# Patient Record
Sex: MALE | Race: WHITE | HISPANIC OR LATINO | Employment: OTHER | URBAN - METROPOLITAN AREA
[De-identification: names, ages, dates, MRNs, and addresses within clinical notes are randomized per-mention and may not be internally consistent; named-entity substitution may affect disease eponyms.]

---

## 2017-09-08 ENCOUNTER — OFFICE VISIT (OUTPATIENT)
Dept: MEDICAL GROUP | Facility: PHYSICIAN GROUP | Age: 77
End: 2017-09-08
Payer: MEDICARE

## 2017-09-08 VITALS
WEIGHT: 149 LBS | BODY MASS INDEX: 23.95 KG/M2 | RESPIRATION RATE: 16 BRPM | DIASTOLIC BLOOD PRESSURE: 90 MMHG | HEIGHT: 66 IN | HEART RATE: 62 BPM | TEMPERATURE: 98.5 F | OXYGEN SATURATION: 99 % | SYSTOLIC BLOOD PRESSURE: 142 MMHG

## 2017-09-08 DIAGNOSIS — E11.9 TYPE 2 DIABETES MELLITUS WITHOUT COMPLICATION, WITHOUT LONG-TERM CURRENT USE OF INSULIN (HCC): ICD-10-CM

## 2017-09-08 DIAGNOSIS — N40.1 BENIGN NON-NODULAR PROSTATIC HYPERPLASIA WITH LOWER URINARY TRACT SYMPTOMS: ICD-10-CM

## 2017-09-08 DIAGNOSIS — I10 ESSENTIAL HYPERTENSION: ICD-10-CM

## 2017-09-08 DIAGNOSIS — Z23 NEED FOR VACCINATION: ICD-10-CM

## 2017-09-08 PROCEDURE — 90662 IIV NO PRSV INCREASED AG IM: CPT | Performed by: INTERNAL MEDICINE

## 2017-09-08 PROCEDURE — 99204 OFFICE O/P NEW MOD 45 MIN: CPT | Mod: 25 | Performed by: INTERNAL MEDICINE

## 2017-09-08 PROCEDURE — G0008 ADMIN INFLUENZA VIRUS VAC: HCPCS | Performed by: INTERNAL MEDICINE

## 2017-09-08 RX ORDER — TAMSULOSIN HYDROCHLORIDE 0.4 MG/1
0.4 CAPSULE ORAL DAILY
Qty: 30 CAP | Refills: 1 | Status: SHIPPED | OUTPATIENT
Start: 2017-09-08 | End: 2017-10-25 | Stop reason: SDUPTHER

## 2017-09-08 RX ORDER — GLIPIZIDE 5 MG/1
5 TABLET ORAL 2 TIMES DAILY
COMMUNITY
End: 2017-09-08

## 2017-09-08 RX ORDER — TAMSULOSIN HYDROCHLORIDE 0.4 MG/1
0.4 CAPSULE ORAL DAILY
COMMUNITY
End: 2017-09-08

## 2017-09-08 RX ORDER — GLIPIZIDE 5 MG/1
5 TABLET ORAL 2 TIMES DAILY
Qty: 60 TAB | Refills: 1 | Status: SHIPPED | OUTPATIENT
Start: 2017-09-08 | End: 2017-10-25 | Stop reason: SDUPTHER

## 2017-09-08 ASSESSMENT — PATIENT HEALTH QUESTIONNAIRE - PHQ9: CLINICAL INTERPRETATION OF PHQ2 SCORE: 0

## 2017-09-08 NOTE — ASSESSMENT & PLAN NOTE
Not sure about the names of meds. He denies chest pain, palpitation, headache, blurry visions, leg swelling

## 2017-09-08 NOTE — PROGRESS NOTES
New Patient to Establish    Reason to establish: Diabetes, medication refills    Wilmar Zaragoza is a 76 y.o. male here today for evaluation and management of:    Benign non-nodular prostatic hyperplasia with lower urinary tract symptoms  Pain during urination. tamsulosin release his symptoms.     Type 2 diabetes mellitus without complication (CMS-HCC)  Japanese speaking, he tells me that he is on metformin 500 mg BID and glipizide 5 mg BID. He does check fingerstick glucose in the morning and sometimes before he goes to sleep. he reports that the glucose level is between 120-130. He cannot recall his last A1c. Not clear if he is on aspirin. He tells me that he is on BP meds, but does not know the name, and he did not brought all meds with him, just the ones he was running out. He denies unintentional weight loss, nocturia, polyuria, numbness or tingling.    Essential hypertension  Not sure about the names of meds. He denies chest pain, palpitation, headache, blurry visions, leg swelling       Past Medical History:   Diagnosis Date   • Diabetes (CMS-HCC)    • Hyperlipidemia    • Hypertension        Current Outpatient Prescriptions   Medication Sig Dispense Refill   • glipiZIDE (GLUCOTROL) 5 MG Tab Take 1 Tab by mouth 2 times a day. 60 Tab 1   • metformin (GLUCOPHAGE) 500 MG Tab Take 1 Tab by mouth 2 times a day, with meals. 60 Tab 1   • tamsulosin (FLOMAX) 0.4 MG capsule Take 1 Cap by mouth every day. 30 Cap 1     No current facility-administered medications for this visit.        Allergies as of 09/08/2017   • (No Known Allergies)       Social History     Social History   • Marital status:      Spouse name: N/A   • Number of children: N/A   • Years of education: N/A     Occupational History   • Not on file.     Social History Main Topics   • Smoking status: Former Smoker     Packs/day: 2.00     Years: 7.00     Quit date: 9/8/1993   • Smokeless tobacco: Never Used   • Alcohol use No   • Drug use: No   • Sexual  "activity: Yes     Partners: Female      Comment: 2 sons     Other Topics Concern   • Not on file     Social History Narrative   • No narrative on file       Family History   Problem Relation Age of Onset   • Cancer Sister      stomach cancer   • Diabetes Neg Hx    • Heart Disease Neg Hx        Past Surgical History:   Procedure Laterality Date   • CATARACT EXTRACTION WITH IOL         ROS: All systems reviewed are negative except for HPI    /90   Pulse 62   Temp 36.9 °C (98.5 °F)   Resp 16   Ht 1.664 m (5' 5.5\")   Wt 67.6 kg (149 lb)   SpO2 99%   BMI 24.42 kg/m²     Physical Exam  General:  Alert and oriented, No apparent distress.  Eyes: Pupils equal and reactive. No scleral icterus. EOMI  Throat: Clear no erythema or exudates noted. Oral mucosa moist, oral dental intact  Neck: Supple. No cervical or supraclavicular lymphadenopathy noted. Thyroid not enlarged.  Lungs: normal effort,  Clear to auscultation  Cardiovascular: Regular rate and rhythm. No murmurs, rubs or gallops, pulses intact   Abdomen:  Soft, +BS, no tenderness. No rebound or guarding noted. No hepato or splenomegaly   Extremities: No clubbing, cyanosis, edema.  Neuro: cranial nerves intact, sensation intact   Muscle skeletal: muscle strength 5/5 on all extremities   Skin: Clear. No rash or suspicious skin lesions noted.      Assessment and Plan    1. Essential hypertension  Controlled today in the clinic. Continue same treatment. We will reevaluate next appointment. Patient needs to be on Ace or ARB  - CBC WITH DIFFERENTIAL; Future    2. Type 2 diabetes mellitus without complication, without long-term current use of insulin (CMS-[facility])  Continue same treatment. Reevaluate in next appointment. Make sure patient is on  aspirin and statins  - HEMOGLOBIN A1C; Future  - MICROALBUMIN CREAT RATIO URINE; Future  - CBC WITH DIFFERENTIAL; Future  - COMP METABOLIC PANEL; Future  - LIPID PROFILE; Future  - TSH WITH REFLEX TO FT4; Future    3. Benign " non-nodular prostatic hyperplasia with lower urinary tract symptoms  Continue same treatment   - CBC WITH DIFFERENTIAL; Future    4. Need for vaccination  Flu shot given, will try to get rest of pervious medical records.   - INFLUENZA VACCINE, HIGH DOSE (65+ ONLY)      Followup: Return in about 4 weeks (around 10/6/2017) for Short.    Signed by: Gera Dee M.D.

## 2017-09-09 ENCOUNTER — HOSPITAL ENCOUNTER (OUTPATIENT)
Dept: LAB | Facility: MEDICAL CENTER | Age: 77
End: 2017-09-09
Attending: INTERNAL MEDICINE
Payer: MEDICARE

## 2017-09-09 DIAGNOSIS — N40.1 BENIGN NON-NODULAR PROSTATIC HYPERPLASIA WITH LOWER URINARY TRACT SYMPTOMS: ICD-10-CM

## 2017-09-09 DIAGNOSIS — E11.9 TYPE 2 DIABETES MELLITUS WITHOUT COMPLICATION, WITHOUT LONG-TERM CURRENT USE OF INSULIN (HCC): ICD-10-CM

## 2017-09-09 DIAGNOSIS — I10 ESSENTIAL HYPERTENSION: ICD-10-CM

## 2017-09-09 LAB
ALBUMIN SERPL BCP-MCNC: 4.3 G/DL (ref 3.2–4.9)
ALBUMIN/GLOB SERPL: 1.2 G/DL
ALP SERPL-CCNC: 69 U/L (ref 30–99)
ALT SERPL-CCNC: 17 U/L (ref 2–50)
ANION GAP SERPL CALC-SCNC: 8 MMOL/L (ref 0–11.9)
AST SERPL-CCNC: 20 U/L (ref 12–45)
BASOPHILS # BLD AUTO: 1 % (ref 0–1.8)
BASOPHILS # BLD: 0.05 K/UL (ref 0–0.12)
BILIRUB SERPL-MCNC: 0.6 MG/DL (ref 0.1–1.5)
BUN SERPL-MCNC: 11 MG/DL (ref 8–22)
CALCIUM SERPL-MCNC: 9.7 MG/DL (ref 8.5–10.5)
CHLORIDE SERPL-SCNC: 103 MMOL/L (ref 96–112)
CHOLEST SERPL-MCNC: 174 MG/DL (ref 100–199)
CO2 SERPL-SCNC: 29 MMOL/L (ref 20–33)
CREAT SERPL-MCNC: 0.84 MG/DL (ref 0.5–1.4)
CREAT UR-MCNC: 18.6 MG/DL
EOSINOPHIL # BLD AUTO: 0.12 K/UL (ref 0–0.51)
EOSINOPHIL NFR BLD: 2.4 % (ref 0–6.9)
ERYTHROCYTE [DISTWIDTH] IN BLOOD BY AUTOMATED COUNT: 42.5 FL (ref 35.9–50)
EST. AVERAGE GLUCOSE BLD GHB EST-MCNC: 166 MG/DL
GFR SERPL CREATININE-BSD FRML MDRD: >60 ML/MIN/1.73 M 2
GLOBULIN SER CALC-MCNC: 3.6 G/DL (ref 1.9–3.5)
GLUCOSE SERPL-MCNC: 149 MG/DL (ref 65–99)
HBA1C MFR BLD: 7.4 % (ref 0–5.6)
HCT VFR BLD AUTO: 44.1 % (ref 42–52)
HDLC SERPL-MCNC: 42 MG/DL
HGB BLD-MCNC: 15.1 G/DL (ref 14–18)
IMM GRANULOCYTES # BLD AUTO: 0.01 K/UL (ref 0–0.11)
IMM GRANULOCYTES NFR BLD AUTO: 0.2 % (ref 0–0.9)
LDLC SERPL CALC-MCNC: 89 MG/DL
LYMPHOCYTES # BLD AUTO: 1.09 K/UL (ref 1–4.8)
LYMPHOCYTES NFR BLD: 22 % (ref 22–41)
MCH RBC QN AUTO: 29.9 PG (ref 27–33)
MCHC RBC AUTO-ENTMCNC: 34.2 G/DL (ref 33.7–35.3)
MCV RBC AUTO: 87.3 FL (ref 81.4–97.8)
MICROALBUMIN UR-MCNC: <0.7 MG/DL
MICROALBUMIN/CREAT UR: NORMAL MG/G (ref 0–30)
MONOCYTES # BLD AUTO: 0.35 K/UL (ref 0–0.85)
MONOCYTES NFR BLD AUTO: 7.1 % (ref 0–13.4)
NEUTROPHILS # BLD AUTO: 3.34 K/UL (ref 1.82–7.42)
NEUTROPHILS NFR BLD: 67.3 % (ref 44–72)
NRBC # BLD AUTO: 0 K/UL
NRBC BLD AUTO-RTO: 0 /100 WBC
PLATELET # BLD AUTO: 235 K/UL (ref 164–446)
PMV BLD AUTO: 11.2 FL (ref 9–12.9)
POTASSIUM SERPL-SCNC: 4 MMOL/L (ref 3.6–5.5)
PROT SERPL-MCNC: 7.9 G/DL (ref 6–8.2)
RBC # BLD AUTO: 5.05 M/UL (ref 4.7–6.1)
SODIUM SERPL-SCNC: 140 MMOL/L (ref 135–145)
TRIGL SERPL-MCNC: 216 MG/DL (ref 0–149)
TSH SERPL DL<=0.005 MIU/L-ACNC: 0.93 UIU/ML (ref 0.3–3.7)
WBC # BLD AUTO: 5 K/UL (ref 4.8–10.8)

## 2017-09-09 PROCEDURE — 84443 ASSAY THYROID STIM HORMONE: CPT

## 2017-09-09 PROCEDURE — 80061 LIPID PANEL: CPT

## 2017-09-09 PROCEDURE — 83036 HEMOGLOBIN GLYCOSYLATED A1C: CPT

## 2017-09-09 PROCEDURE — 82043 UR ALBUMIN QUANTITATIVE: CPT

## 2017-09-09 PROCEDURE — 85025 COMPLETE CBC W/AUTO DIFF WBC: CPT

## 2017-09-09 PROCEDURE — 80053 COMPREHEN METABOLIC PANEL: CPT

## 2017-09-09 PROCEDURE — 82570 ASSAY OF URINE CREATININE: CPT

## 2017-09-09 PROCEDURE — 36415 COLL VENOUS BLD VENIPUNCTURE: CPT

## 2017-09-11 ENCOUNTER — TELEPHONE (OUTPATIENT)
Dept: MEDICAL GROUP | Facility: PHYSICIAN GROUP | Age: 77
End: 2017-09-11

## 2017-09-11 NOTE — TELEPHONE ENCOUNTER
----- Message from Gera Dee M.D. sent at 9/9/2017  6:56 PM PDT -----  Please notify patient that blood sugar is not very well controled, but not very bad  , kidney function, liver function, blood count and thyroid  are normal  Cholesterol slight elevated due to diabetes  Continue same medications will review at next apt   Thank you,    Gera Dee M.D.

## 2017-10-12 ENCOUNTER — TELEPHONE (OUTPATIENT)
Dept: MEDICAL GROUP | Facility: PHYSICIAN GROUP | Age: 77
End: 2017-10-12

## 2017-10-12 NOTE — TELEPHONE ENCOUNTER
ESTABLISHED PATIENT PRE-VISIT PLANNING     Note: Patient will not be contacted if there is no indication to call.     1.  Reviewed notes from the last few office visits within the medical group: Yes    2.  If any orders were placed at last visit or intended to be done for this visit (i.e. 6 mos follow-up), do we have Results/Consult Notes?        •  Labs - Labs ordered, completed on 09/09/17 and results are in chart.   Note: If patient appointment is for lab review and patient did not complete labs,                check with provider if OK to reschedule patient until labs completed.       •  Imaging - Imaging was not ordered at last office visit.       •  Referrals - No referrals were ordered at last office visit.    3. Is this appointment scheduled as a Hospital Follow-Up? No    4.  Immunizations were updated in Epic using WebIZ?: Epic matches WebIZ       •  Web Iz Recommendations: PREVNAR (PCV13) , TDAP and ZOSTAVAX (Shingles)    5.  Patient is due for the following Health Maintenance Topics:   Health Maintenance Due   Topic Date Due   • Annual Wellness Visit  1940   • DIABETES MONOFILAMENT / LE EXAM  05/23/1940   • RETINAL SCREENING  11/23/1957   • IMM DTaP/Tdap/Td Vaccine (1 - Tdap) 11/23/1958   • COLONOSCOPY  11/23/1989   • IMM ZOSTER VACCINE  11/23/1999   • IMM PNEUMOCOCCAL 65+ (ADULT) LOW/MEDIUM RISK SERIES (1 of 2 - PCV13) 11/23/2004       - Patient has completed FLU Immunization(s) per WebIZ. Chart has been updated.      6.  Patient was NOT informed to arrive 15 min prior to their scheduled appointment and bring in their medication bottles.

## 2017-10-13 ENCOUNTER — OFFICE VISIT (OUTPATIENT)
Dept: MEDICAL GROUP | Facility: PHYSICIAN GROUP | Age: 77
End: 2017-10-13
Payer: MEDICARE

## 2017-10-13 VITALS
RESPIRATION RATE: 14 BRPM | HEART RATE: 63 BPM | BODY MASS INDEX: 22.98 KG/M2 | TEMPERATURE: 97.4 F | DIASTOLIC BLOOD PRESSURE: 74 MMHG | HEIGHT: 66 IN | SYSTOLIC BLOOD PRESSURE: 126 MMHG | OXYGEN SATURATION: 98 % | WEIGHT: 143 LBS

## 2017-10-13 DIAGNOSIS — Z12.11 SCREEN FOR COLON CANCER: ICD-10-CM

## 2017-10-13 DIAGNOSIS — I10 ESSENTIAL HYPERTENSION: ICD-10-CM

## 2017-10-13 DIAGNOSIS — E11.9 TYPE 2 DIABETES MELLITUS WITHOUT COMPLICATION, WITHOUT LONG-TERM CURRENT USE OF INSULIN (HCC): ICD-10-CM

## 2017-10-13 DIAGNOSIS — N40.1 BENIGN NON-NODULAR PROSTATIC HYPERPLASIA WITH LOWER URINARY TRACT SYMPTOMS: ICD-10-CM

## 2017-10-13 DIAGNOSIS — E78.2 MIXED HYPERLIPIDEMIA: ICD-10-CM

## 2017-10-13 DIAGNOSIS — R63.4 WEIGHT LOSS: ICD-10-CM

## 2017-10-13 DIAGNOSIS — Z12.5 PROSTATE CANCER SCREENING: ICD-10-CM

## 2017-10-13 DIAGNOSIS — E78.5 HYPERLIPIDEMIA, UNSPECIFIED HYPERLIPIDEMIA TYPE: ICD-10-CM

## 2017-10-13 PROCEDURE — 99214 OFFICE O/P EST MOD 30 MIN: CPT | Performed by: INTERNAL MEDICINE

## 2017-10-13 NOTE — PATIENT INSTRUCTIONS
Start taking metformin one tab and a half in the morning and at night   Start taking blood pressure medications   Start taking baby aspirin daily   Start taking cholesterol medicine  Please call us with the name of medications for blood pressure and cholesterol medicine   Do prostate test lab work whenever you can  Follow up with eye doctor  Follow up for colonoscopy

## 2017-10-13 NOTE — ASSESSMENT & PLAN NOTE
He has been followed by urologist in the past. He is on tamsulosin 0.4 mg daily. He denies any symptoms. He reports prostate biopsy and was normal

## 2017-10-13 NOTE — ASSESSMENT & PLAN NOTE
As per above, stopped statin on his own.   Lipid panel ok, LDL 89. I encouraged to restart statin

## 2017-10-13 NOTE — ASSESSMENT & PLAN NOTE
He claims weight loss due to taking care of grand kids and working. He denies lymphadenopathy, night sweats, dysphagia, melanotic stool, hematochezia. He had prostate checked 5 years ago. NO Urination problem,   He has never had a colonoscopy.

## 2017-10-13 NOTE — ASSESSMENT & PLAN NOTE
Last a1c 7.4. He supposed to be on ACE and statin, but he stopped them on his own. No microalbuminuria. Monofilament testing done today. No complications. Long discussion and education about diabetes. He tries to eat healthy, once in awhile he would get a dessert. Denies neuropathy, retinopathy. He used to follow up with ophthalmologist

## 2017-10-13 NOTE — PROGRESS NOTES
Subjective:   Wilmar Zaragoza is a 76 y.o. male here today for Diabetes, weight loss    Weight loss  He claims weight loss due to taking care of grand kids and working. He denies lymphadenopathy, night sweats, dysphagia, melanotic stool, hematochezia. He had prostate checked 5 years ago. NO Urination problem,   He has never had a colonoscopy.    Type 2 diabetes mellitus without complication (CMS-HCC)  Last a1c 7.4. He supposed to be on ACE and statin, but he stopped them on his own. No microalbuminuria. Monofilament testing done today. No complications. Long discussion and education about diabetes. He tries to eat healthy, once in awhile he would get a dessert. Denies neuropathy, retinopathy. He used to follow up with ophthalmologist    Essential hypertension  He suppose to be on meds. Not taking.     Benign non-nodular prostatic hyperplasia with lower urinary tract symptoms  He has been followed by urologist in the past. He is on tamsulosin 0.4 mg daily. He denies any symptoms. He reports prostate biopsy and was normal    Hyperlipidemia  As per above, stopped statin on his own.   Lipid panel ok, LDL 89. I encouraged to restart statin        Current medicines (including changes today)  Current Outpatient Prescriptions   Medication Sig Dispense Refill   • glipiZIDE (GLUCOTROL) 5 MG Tab Take 1 Tab by mouth 2 times a day. 60 Tab 1   • metformin (GLUCOPHAGE) 500 MG Tab Take 1 Tab by mouth 2 times a day, with meals. 60 Tab 1   • tamsulosin (FLOMAX) 0.4 MG capsule Take 1 Cap by mouth every day. 30 Cap 1     No current facility-administered medications for this visit.      He  has a past medical history of Diabetes (CMS-HCC); Hyperlipidemia; and Hypertension.    Current Outpatient Prescriptions   Medication Sig Dispense Refill   • glipiZIDE (GLUCOTROL) 5 MG Tab Take 1 Tab by mouth 2 times a day. 60 Tab 1   • metformin (GLUCOPHAGE) 500 MG Tab Take 1 Tab by mouth 2 times a day, with meals. 60 Tab 1   • tamsulosin (FLOMAX) 0.4  "MG capsule Take 1 Cap by mouth every day. 30 Cap 1     No current facility-administered medications for this visit.        Allergies as of 10/13/2017   • (No Known Allergies)       Social History     Social History   • Marital status:      Spouse name: N/A   • Number of children: N/A   • Years of education: N/A     Occupational History   • Not on file.     Social History Main Topics   • Smoking status: Former Smoker     Packs/day: 2.00     Years: 7.00     Quit date: 9/8/1993   • Smokeless tobacco: Never Used   • Alcohol use No   • Drug use: No   • Sexual activity: Yes     Partners: Female      Comment: 2 sons     Other Topics Concern   • Not on file     Social History Narrative   • No narrative on file        Family History   Problem Relation Age of Onset   • Cancer Sister      stomach cancer   • Diabetes Neg Hx    • Heart Disease Neg Hx        Past Surgical History:   Procedure Laterality Date   • CATARACT EXTRACTION WITH IOL         ROS   All systems reviewed are negative except for HPI       Objective:     Blood pressure 126/74, pulse 63, temperature 36.3 °C (97.4 °F), resp. rate 14, height 1.664 m (5' 5.5\"), weight 64.9 kg (143 lb), SpO2 98 %. Body mass index is 23.43 kg/m².   Physical Exam:  Constitutional: Alert, no distress.  Skin: Warm, dry, good turgor, no rashes in visible areas.  Eye: Equal, round and reactive, conjunctiva clear, lids normal.  ENMT: Lips without lesions, good dentition, oropharynx clear.  Neck: Trachea midline, no masses, no thyromegaly. No cervical or supraclavicular lymphadenopathy  Respiratory: Unlabored respiratory effort, lungs clear to auscultation, no wheezes, no ronchi.  Cardiovascular: Normal S1, S2, no murmur, no edema.  Abdomen: Soft, non-tender, no masses, no hepatosplenomegaly.  Psych: Alert and oriented x3, normal affect and mood.  Monofilament testing with a 10 gram force: sensation intact: intact bilaterally  Visual Inspection: Feet without maceration, ulcers, " fissures.  Pedal pulses: intact bilaterally          Assessment and Plan:   The following treatment plan was discussed    1. Mixed hyperlipidemia  I encouraged to restart statin. Continue to monitor       2. Type 2 diabetes mellitus without complication, without long-term current use of insulin (CMS-[facility])  Ok controled. Increased metformin to 1.5 tab BID. Continue glipizide, he is concerned to increase metformin to 1000 mg bid due to diarrhea as side effect  Follow up with ophthalmology   Continue to monitor   - REFERRAL TO OPHTHALMOLOGY  - Diabetic Monofilament Lower Extremity Exam    4. Essential hypertension  Restart lisinopril, continue to monitor     5. Benign non-nodular prostatic hyperplasia with lower urinary tract symptoms  Continue same treatment, continue to monitor     6. Weight loss  Rule out prostate cancer, colonoscopy screening   - PROSTATE SPECIFIC AG SCREENING; Future    7. Screen for colon cancer  - REFERRAL TO GI FOR COLONOSCOPY    8. Prostate cancer screening  - PROSTATE SPECIFIC AG SCREENING; Future      Followup: Return in about 3 months (around 1/13/2018) for Short.

## 2017-10-25 DIAGNOSIS — E11.9 TYPE 2 DIABETES MELLITUS WITHOUT COMPLICATION, WITHOUT LONG-TERM CURRENT USE OF INSULIN (HCC): ICD-10-CM

## 2017-10-25 RX ORDER — TAMSULOSIN HYDROCHLORIDE 0.4 MG/1
0.4 CAPSULE ORAL DAILY
Qty: 90 CAP | Refills: 1 | Status: SHIPPED | OUTPATIENT
Start: 2017-10-25 | End: 2018-03-04 | Stop reason: SDUPTHER

## 2017-10-25 RX ORDER — GLIPIZIDE 5 MG/1
TABLET ORAL
Qty: 180 TAB | Refills: 1 | Status: SHIPPED | OUTPATIENT
Start: 2017-10-25 | End: 2018-03-05 | Stop reason: SDUPTHER

## 2017-10-25 NOTE — TELEPHONE ENCOUNTER
Was the patient seen in the last year in this department? Yes     Does patient have an active prescription for medications requested? No     Received Request Via: Patient     Pt has an appt with you in January but will run out of his meds before then. He would like them refilled and sent to [facility] in the chart. He also needs a refill of the one touch ultra  Test strips. Please order. Pt was very hard to under stand due to language barrier.

## 2017-12-12 ENCOUNTER — OFFICE VISIT (OUTPATIENT)
Dept: MEDICAL GROUP | Facility: PHYSICIAN GROUP | Age: 77
End: 2017-12-12
Payer: MEDICARE

## 2017-12-12 VITALS
TEMPERATURE: 98.3 F | WEIGHT: 142 LBS | RESPIRATION RATE: 16 BRPM | HEIGHT: 66 IN | HEART RATE: 74 BPM | DIASTOLIC BLOOD PRESSURE: 82 MMHG | BODY MASS INDEX: 22.82 KG/M2 | OXYGEN SATURATION: 97 % | SYSTOLIC BLOOD PRESSURE: 156 MMHG

## 2017-12-12 DIAGNOSIS — R63.4 WEIGHT LOSS: ICD-10-CM

## 2017-12-12 DIAGNOSIS — I10 ESSENTIAL HYPERTENSION: ICD-10-CM

## 2017-12-12 DIAGNOSIS — E11.9 TYPE 2 DIABETES MELLITUS WITHOUT COMPLICATION, WITHOUT LONG-TERM CURRENT USE OF INSULIN (HCC): ICD-10-CM

## 2017-12-12 DIAGNOSIS — E78.2 MIXED HYPERLIPIDEMIA: ICD-10-CM

## 2017-12-12 DIAGNOSIS — Z12.5 PROSTATE CANCER SCREENING: ICD-10-CM

## 2017-12-12 DIAGNOSIS — N40.1 BENIGN NON-NODULAR PROSTATIC HYPERPLASIA WITH LOWER URINARY TRACT SYMPTOMS: ICD-10-CM

## 2017-12-12 PROCEDURE — 99214 OFFICE O/P EST MOD 30 MIN: CPT | Performed by: INTERNAL MEDICINE

## 2017-12-12 RX ORDER — BENAZEPRIL HYDROCHLORIDE 10 MG/1
10 TABLET ORAL DAILY
COMMUNITY
End: 2017-12-12

## 2017-12-12 RX ORDER — BENAZEPRIL HYDROCHLORIDE 10 MG/1
10 TABLET ORAL DAILY
Qty: 90 TAB | Refills: 3 | Status: SHIPPED | OUTPATIENT
Start: 2017-12-12 | End: 2018-03-05 | Stop reason: SDUPTHER

## 2017-12-12 RX ORDER — ACETAMINOPHEN 500 MG
500-1000 TABLET ORAL EVERY 6 HOURS PRN
COMMUNITY
End: 2020-11-16

## 2017-12-13 NOTE — ASSESSMENT & PLAN NOTE
He is taking metformin 500 mg TID. No diarrhea, he is on glipizide 5 mg BID. He tells me that glucose at home is better. He had eye exam done today, report is still pending.

## 2017-12-13 NOTE — ASSESSMENT & PLAN NOTE
Elevated today. He tells me that lately has been elevated at home too. I rechecked BP and it is 148/75.. He denies headache, chest pain, palpitation, lightheadedness, leg swelling, blurry vision.

## 2017-12-13 NOTE — ASSESSMENT & PLAN NOTE
He tells me that he is having more problems empty his bladder. Stream is weak, he is asking for referral for urology. He denies hematuria . He is taking tamsulosin, but still has problems as per above, referral in place, lab work is pending

## 2017-12-13 NOTE — PROGRESS NOTES
Subjective:   Wilmar Zaragoza is a 77 y.o. male here today for hypertension, For prostate problems, diabetes    Weight loss  Stable, only one pound loss. He had not done colonoscopy or prostate enzymes.     Type 2 diabetes mellitus without complication (CMS-HCC)  He is taking metformin 500 mg TID. No diarrhea, he is on glipizide 5 mg BID. He tells me that glucose at home is better. He had eye exam done today, report is still pending.     Prostate cancer screening  Lab work ordered     Hyperlipidemia  He refuses statin at this time. Continue to monitor.     Essential hypertension  Elevated today. He tells me that lately has been elevated at home too. I rechecked BP and it is 148/75.. He denies headache, chest pain, palpitation, lightheadedness, leg swelling, blurry vision.     Benign non-nodular prostatic hyperplasia with lower urinary tract symptoms  He tells me that he is having more problems empty his bladder. Stream is weak, he is asking for referral for urology. He denies hematuria . He is taking tamsulosin, but still has problems as per above, referral in place, lab work is pending        Current medicines (including changes today)  Current Outpatient Prescriptions   Medication Sig Dispense Refill   • acetaminophen (TYLENOL) 500 MG Tab Take 500-1,000 mg by mouth every 6 hours as needed.     • benazepril (LOTENSIN) 10 MG Tab Take 1 Tab by mouth every day. 90 Tab 3   • metformin (GLUCOPHAGE) 500 MG Tab Take 1 Tab by mouth 2 times a day, with meals. 180 Tab 1   • tamsulosin (FLOMAX) 0.4 MG capsule Take 1 Cap by mouth every day. 90 Cap 1   • glucose blood strip 1 Strip by Other route 2 Times a Day. 180 Strip 1   • glipiZIDE (GLUCOTROL) 5 MG Tab TAKE 1 TABLET BY MOUTH TWICE DAILY 180 Tab 1     No current facility-administered medications for this visit.      He  has a past medical history of Diabetes (CMS-HCC); Hyperlipidemia; and Hypertension.    Current Outpatient Prescriptions   Medication Sig Dispense Refill   •  "acetaminophen (TYLENOL) 500 MG Tab Take 500-1,000 mg by mouth every 6 hours as needed.     • benazepril (LOTENSIN) 10 MG Tab Take 1 Tab by mouth every day. 90 Tab 3   • metformin (GLUCOPHAGE) 500 MG Tab Take 1 Tab by mouth 2 times a day, with meals. 180 Tab 1   • tamsulosin (FLOMAX) 0.4 MG capsule Take 1 Cap by mouth every day. 90 Cap 1   • glucose blood strip 1 Strip by Other route 2 Times a Day. 180 Strip 1   • glipiZIDE (GLUCOTROL) 5 MG Tab TAKE 1 TABLET BY MOUTH TWICE DAILY 180 Tab 1     No current facility-administered medications for this visit.        Allergies as of 12/12/2017   • (No Known Allergies)       Social History     Social History   • Marital status:      Spouse name: N/A   • Number of children: N/A   • Years of education: N/A     Occupational History   • Not on file.     Social History Main Topics   • Smoking status: Former Smoker     Packs/day: 2.00     Years: 7.00     Quit date: 9/8/1993   • Smokeless tobacco: Never Used   • Alcohol use No   • Drug use: No   • Sexual activity: Yes     Partners: Female      Comment: 2 sons     Other Topics Concern   • Not on file     Social History Narrative   • No narrative on file        Family History   Problem Relation Age of Onset   • Cancer Sister      stomach cancer   • Diabetes Neg Hx    • Heart Disease Neg Hx        Past Surgical History:   Procedure Laterality Date   • CATARACT EXTRACTION WITH IOL         ROS   All systems reviewed are negative except for HPI       Objective:     Blood pressure 156/82, pulse 74, temperature 36.8 °C (98.3 °F), resp. rate 16, height 1.664 m (5' 5.5\"), weight 64.4 kg (142 lb), SpO2 97 %. Body mass index is 23.27 kg/m².   Physical Exam:  Constitutional: Alert, no distress.  Skin: Warm, dry, good turgor, no rashes in visible areas.  Eye: Equal, round and reactive, conjunctiva clear, lids normal.  ENMT: Lips without lesions, good dentition, oropharynx clear.  Neck: Trachea midline, no masses, no thyromegaly. No " cervical or supraclavicular lymphadenopathy  Respiratory: Unlabored respiratory effort, lungs clear to auscultation, no wheezes, no ronchi.  Cardiovascular: Normal S1, S2, no murmur, no edema.  Abdomen: Soft, non-tender, no masses, no hepatosplenomegaly.  Psych: Alert and oriented x3, normal affect and mood.        Assessment and Plan:   The following treatment plan was discussed    1. Benign non-nodular prostatic hyperplasia with lower urinary tract symptoms  Lab work to follow, follow up with urology   - REFERRAL TO UROLOGY  - PROSTATE SPECIFIC AG SCREENING; Future    2. Essential hypertension   He suppose to be on benazepril but he is not taking it .     3. Mixed hyperlipidemia  reevaluate at next apt. He refuses statin at this time     4. Type 2 diabetes mellitus without complication, without long-term current use of insulin (CMS-[facility])  Continue to monitor   - HEMOGLOBIN A1C; Future  - COMP METABOLIC PANEL; Future    5. Prostate cancer screening  - PROSTATE SPECIFIC AG SCREENING; Future    6. Weight loss  Continue to monitor       Followup: Return in about 4 weeks (around 1/9/2018), or if symptoms worsen or fail to improve, for Short.

## 2018-01-16 ENCOUNTER — OFFICE VISIT (OUTPATIENT)
Dept: MEDICAL GROUP | Facility: PHYSICIAN GROUP | Age: 78
End: 2018-01-16
Payer: MEDICARE

## 2018-01-16 VITALS
OXYGEN SATURATION: 99 % | TEMPERATURE: 97.2 F | RESPIRATION RATE: 14 BRPM | HEART RATE: 66 BPM | DIASTOLIC BLOOD PRESSURE: 68 MMHG | BODY MASS INDEX: 22.66 KG/M2 | HEIGHT: 66 IN | SYSTOLIC BLOOD PRESSURE: 132 MMHG | WEIGHT: 141 LBS

## 2018-01-16 DIAGNOSIS — R63.4 WEIGHT LOSS: ICD-10-CM

## 2018-01-16 DIAGNOSIS — G89.29 CHRONIC RIGHT SHOULDER PAIN: ICD-10-CM

## 2018-01-16 DIAGNOSIS — I10 ESSENTIAL HYPERTENSION: ICD-10-CM

## 2018-01-16 DIAGNOSIS — N40.1 BENIGN NON-NODULAR PROSTATIC HYPERPLASIA WITH LOWER URINARY TRACT SYMPTOMS: ICD-10-CM

## 2018-01-16 DIAGNOSIS — M25.511 CHRONIC RIGHT SHOULDER PAIN: ICD-10-CM

## 2018-01-16 DIAGNOSIS — E78.2 MIXED HYPERLIPIDEMIA: ICD-10-CM

## 2018-01-16 DIAGNOSIS — E11.9 TYPE 2 DIABETES MELLITUS WITHOUT COMPLICATION, WITHOUT LONG-TERM CURRENT USE OF INSULIN (HCC): ICD-10-CM

## 2018-01-16 DIAGNOSIS — Z12.5 PROSTATE CANCER SCREENING: ICD-10-CM

## 2018-01-16 PROCEDURE — 99214 OFFICE O/P EST MOD 30 MIN: CPT | Performed by: INTERNAL MEDICINE

## 2018-01-16 NOTE — ASSESSMENT & PLAN NOTE
Last lipid panel 09/2017. LDL 89. He refuses statin. He is 77, I agree with his choice. Continue to monitor. No changes at this time

## 2018-01-16 NOTE — ASSESSMENT & PLAN NOTE
He reports sometimes he has right shoulder pain, sometimes else left knee pain if he works a lot. No swelling, no fever. Range of motion intact. No trauma.

## 2018-01-16 NOTE — ASSESSMENT & PLAN NOTE
She is taking metformin 500 mg twice a day, glipizide 5 mg twice a day. Has not been able to check his glucose at home because he did not have glucose strips. He denies hypoglycemia episodes. He is up-to-date with lipid panel, diabetes monofilament exam, retinal screen, urine microalbumin. Last a1c 7.4. Continue to monitor

## 2018-01-16 NOTE — ASSESSMENT & PLAN NOTE
He is on benazepril 10 mg daily. He denies chest pain, shortness of breath, headache, blurry vision, excellent complications. Very well controlled

## 2018-01-16 NOTE — PROGRESS NOTES
Subjective:   Wilmar Zaragoza is a 77 y.o. male here today for diabetes, medication refill     Type 2 diabetes mellitus without complication (CMS-formerly [facility])  She is taking metformin 500 mg twice a day, glipizide 5 mg twice a day. Has not been able to check his glucose at home because he did not have glucose strips. He denies hypoglycemia episodes. He is up-to-date with lipid panel, diabetes monofilament exam, retinal screen, urine microalbumin. Last a1c 7.4. Continue to monitor     Hyperlipidemia  Last lipid panel 09/2017. LDL 89. He refuses statin. He is 77, I agree with his choice. Continue to monitor. No changes at this time    Essential hypertension  He is on benazepril 10 mg daily. He denies chest pain, shortness of breath, headache, blurry vision, excellent complications. Very well controlled    Benign non-nodular prostatic hyperplasia with lower urinary tract symptoms  Symptoms well controled on tamsulosin 0.4 mg daily.     Weight loss  Stable. He works a lot. He takes care of grandchildren. No more weight loss      Chronic right shoulder pain  He reports sometimes he has right shoulder pain, sometimes else left knee pain if he works a lot. No swelling, no fever. Range of motion intact. No trauma.        Current medicines (including changes today)  Current Outpatient Prescriptions   Medication Sig Dispense Refill   • glucose blood strip 1 Strip by Other route 2 Times a Day. 180 Strip 1   • acetaminophen (TYLENOL) 500 MG Tab Take 500-1,000 mg by mouth every 6 hours as needed.     • benazepril (LOTENSIN) 10 MG Tab Take 1 Tab by mouth every day. 90 Tab 3   • metformin (GLUCOPHAGE) 500 MG Tab Take 1 Tab by mouth 2 times a day, with meals. 180 Tab 1   • tamsulosin (FLOMAX) 0.4 MG capsule Take 1 Cap by mouth every day. 90 Cap 1   • glipiZIDE (GLUCOTROL) 5 MG Tab TAKE 1 TABLET BY MOUTH TWICE DAILY 180 Tab 1     No current facility-administered medications for this visit.      He  has a past medical history of Diabetes  "(CMS-[facility]); Hyperlipidemia; and Hypertension.    Current Outpatient Prescriptions   Medication Sig Dispense Refill   • glucose blood strip 1 Strip by Other route 2 Times a Day. 180 Strip 1   • acetaminophen (TYLENOL) 500 MG Tab Take 500-1,000 mg by mouth every 6 hours as needed.     • benazepril (LOTENSIN) 10 MG Tab Take 1 Tab by mouth every day. 90 Tab 3   • metformin (GLUCOPHAGE) 500 MG Tab Take 1 Tab by mouth 2 times a day, with meals. 180 Tab 1   • tamsulosin (FLOMAX) 0.4 MG capsule Take 1 Cap by mouth every day. 90 Cap 1   • glipiZIDE (GLUCOTROL) 5 MG Tab TAKE 1 TABLET BY MOUTH TWICE DAILY 180 Tab 1     No current facility-administered medications for this visit.        Allergies as of 01/16/2018   • (No Known Allergies)       Social History     Social History   • Marital status:      Spouse name: N/A   • Number of children: N/A   • Years of education: N/A     Occupational History   • Not on file.     Social History Main Topics   • Smoking status: Former Smoker     Packs/day: 2.00     Years: 7.00     Quit date: 9/8/1993   • Smokeless tobacco: Never Used   • Alcohol use No   • Drug use: No   • Sexual activity: Yes     Partners: Female      Comment: 2 sons     Other Topics Concern   • Not on file     Social History Narrative   • No narrative on file        Family History   Problem Relation Age of Onset   • Cancer Sister      stomach cancer   • Diabetes Neg Hx    • Heart Disease Neg Hx        Past Surgical History:   Procedure Laterality Date   • CATARACT EXTRACTION WITH IOL         ROS   All systems reviewed are negative except for HPI       Objective:     Blood pressure 132/68, pulse 66, temperature 36.2 °C (97.2 °F), resp. rate 14, height 1.664 m (5' 5.5\"), weight 64 kg (141 lb), SpO2 99 %. Body mass index is 23.11 kg/m².   Physical Exam:  Constitutional: Alert, no distress.  Skin: Warm, dry, good turgor, no rashes in visible areas.  Eye: Equal, round and reactive, conjunctiva clear, lids normal.  ENMT: " Lips without lesions, good dentition, oropharynx clear.  Neck: Trachea midline, no masses, no thyromegaly. No cervical or supraclavicular lymphadenopathy  Respiratory: Unlabored respiratory effort, lungs clear to auscultation, no wheezes, no ronchi.  Cardiovascular: Normal S1, S2, no murmur, no edema.  Abdomen: Soft, non-tender, no masses, no hepatosplenomegaly.  Psych: Alert and oriented x3, normal affect and mood.  Muscular skeletal: No shoulder tenderness or swelling. Range of motion intact. No left knee swelling or tenderness. Range of motion intact     Assessment and Plan:   The following treatment plan was discussed    1. Essential hypertension  Continue same treatment. Continue to monitor    2. Type 2 diabetes mellitus without complication, without long-term current use of insulin (CMS-[facility])  Continue same treatment. Continue to monitor  - glucose blood strip; 1 Strip by Other route 2 Times a Day.  Dispense: 180 Strip; Refill: 1    3. Mixed hyperlipidemia  Continue to monitor    4. Benign non-nodular prostatic hyperplasia with lower urinary tract symptoms  Continue same treatment. Continue to monitor    5. Prostate cancer screening  Continue to monitor    6. Weight loss  No weight loss, stable     7. Chronic right shoulder pain  Advised patient to use Tylenol as needed. Advised patient to do stretching exercising. he agrees with the plan.      Followup: Return in about 3 months (around 4/16/2018), or if symptoms worsen or fail to improve, for Short, follow up on HTN, medication management, follow up on DMII.

## 2018-01-25 ENCOUNTER — HOSPITAL ENCOUNTER (OUTPATIENT)
Dept: LAB | Facility: MEDICAL CENTER | Age: 78
End: 2018-01-25
Attending: PHYSICIAN ASSISTANT
Payer: MEDICARE

## 2018-01-25 PROCEDURE — 84153 ASSAY OF PSA TOTAL: CPT

## 2018-01-26 LAB — PSA SERPL-MCNC: 7.1 NG/ML (ref 0–4)

## 2018-03-05 DIAGNOSIS — E11.9 TYPE 2 DIABETES MELLITUS WITHOUT COMPLICATION, WITHOUT LONG-TERM CURRENT USE OF INSULIN (HCC): ICD-10-CM

## 2018-03-05 RX ORDER — TAMSULOSIN HYDROCHLORIDE 0.4 MG/1
CAPSULE ORAL
Qty: 90 CAP | Refills: 3 | Status: SHIPPED | OUTPATIENT
Start: 2018-03-05 | End: 2019-06-20

## 2018-03-06 RX ORDER — BENAZEPRIL HYDROCHLORIDE 10 MG/1
10 TABLET ORAL DAILY
Qty: 90 TAB | Refills: 3 | Status: SHIPPED | OUTPATIENT
Start: 2018-03-06 | End: 2018-05-24 | Stop reason: SDUPTHER

## 2018-03-06 RX ORDER — GLIPIZIDE 5 MG/1
TABLET ORAL
Qty: 180 TAB | Refills: 3 | Status: SHIPPED | OUTPATIENT
Start: 2018-03-06 | End: 2019-01-12 | Stop reason: SDUPTHER

## 2018-04-17 ENCOUNTER — APPOINTMENT (OUTPATIENT)
Dept: MEDICAL GROUP | Facility: PHYSICIAN GROUP | Age: 78
End: 2018-04-17
Payer: MEDICARE

## 2018-05-23 ENCOUNTER — TELEPHONE (OUTPATIENT)
Dept: MEDICAL GROUP | Facility: PHYSICIAN GROUP | Age: 78
End: 2018-05-23

## 2018-05-23 NOTE — TELEPHONE ENCOUNTER
ESTABLISHED PATIENT PRE-VISIT PLANNING     Note: Patient will not be contacted if there is no indication to call.     1.  Reviewed notes from the last few office visits within the medical group: Yes    2.  If any orders were placed at last visit or intended to be done for this visit (i.e. 6 mos follow-up), do we have Results/Consult Notes?        •  Labs - Labs ordered, NOT completed. Patient advised to complete prior to next appointment.   Note: If patient appointment is for lab review and patient did not complete labs, check with provider if OK to reschedule patient until labs completed.       •  Imaging - Imaging was not ordered at last office visit.       •  Referrals - No referrals were ordered at last office visit.    3. Is this appointment scheduled as a Hospital Follow-Up? No    4.  Immunizations were updated in [employer] using WebIZ?: Yes       •  Web Iz Recommendations: PREVNAR (PCV13) , TDAP and ZOSTAVAX (Shingles)    5.  Patient is due for the following Health Maintenance Topics:   Health Maintenance Due   Topic Date Due   • Annual Wellness Visit  1940   • IMM DTaP/Tdap/Td Vaccine (1 - Tdap) 11/23/1958   • COLONOSCOPY  11/23/1989   • IMM PNEUMOCOCCAL 65+ (ADULT) LOW/MEDIUM RISK SERIES (1 of 2 - PCV13) 11/23/2004   • A1C SCREENING  03/09/2018       - Patient has completed FLU Immunization(s) per WebIZ. Chart has been updated.    6.  MDX printed for Provider? YES    7.  Patient was NOT informed to arrive 15 min prior to their scheduled appointment and bring in their medication bottles.

## 2018-05-24 ENCOUNTER — OFFICE VISIT (OUTPATIENT)
Dept: MEDICAL GROUP | Facility: PHYSICIAN GROUP | Age: 78
End: 2018-05-24
Payer: MEDICARE

## 2018-05-24 VITALS
DIASTOLIC BLOOD PRESSURE: 78 MMHG | TEMPERATURE: 97.6 F | OXYGEN SATURATION: 98 % | WEIGHT: 140 LBS | HEIGHT: 66 IN | HEART RATE: 66 BPM | RESPIRATION RATE: 16 BRPM | SYSTOLIC BLOOD PRESSURE: 126 MMHG | BODY MASS INDEX: 22.5 KG/M2

## 2018-05-24 DIAGNOSIS — E11.9 TYPE 2 DIABETES MELLITUS WITHOUT COMPLICATION, WITHOUT LONG-TERM CURRENT USE OF INSULIN (HCC): ICD-10-CM

## 2018-05-24 DIAGNOSIS — E78.2 MIXED HYPERLIPIDEMIA: ICD-10-CM

## 2018-05-24 DIAGNOSIS — C61 PROSTATE CANCER (HCC): ICD-10-CM

## 2018-05-24 DIAGNOSIS — I10 ESSENTIAL HYPERTENSION: ICD-10-CM

## 2018-05-24 PROBLEM — R63.4 WEIGHT LOSS: Status: RESOLVED | Noted: 2017-10-13 | Resolved: 2018-05-24

## 2018-05-24 PROCEDURE — 99214 OFFICE O/P EST MOD 30 MIN: CPT | Performed by: INTERNAL MEDICINE

## 2018-05-24 RX ORDER — BENAZEPRIL HYDROCHLORIDE 10 MG/1
10 TABLET ORAL DAILY
Qty: 90 TAB | Refills: 3 | Status: SHIPPED | OUTPATIENT
Start: 2018-05-24 | End: 2019-01-31

## 2018-05-24 NOTE — PROGRESS NOTES
Subjective:   Wilmar Zaragoza is a 77 y.o. male here today for medication refill, recent diagnose of prostate cancer     Type 2 diabetes mellitus without complication (CMS-HCC)  No recent lab work. He is still on same medications. He is on metformin, glipizide. Last a1c 7.4. We will continue to monitor     Prostate cancer (HCC)  I did send pt to urology because PSA was elevated. He had biopsy and diagnose with prostate cancer. He refused surgery and treatment because of the cost. He denies weight loss. He has some lower pelvic pain, but he denies haematuria. He states that he has had happy life until now, and he is 80 y/o. It is possible time for him to go, whenever it will be. He refuses to follow up with urology, family aware of his decisions.     Hyperlipidemia  Primary prevention . Refuses statin. With his diagnose and age., no indication for statin, continue to monitor     Essential hypertension  Well controled on benazepril 10 mg daily. He denies chest pain, shortness of breath, leg swelling, palpitation, lightheadedness or blurry vision.       Current medicines (including changes today)  Current Outpatient Prescriptions   Medication Sig Dispense Refill   • benazepril (LOTENSIN) 10 MG Tab Take 1 Tab by mouth every day. 90 Tab 3   • metFORMIN (GLUCOPHAGE) 500 MG Tab Take 1 Tab by mouth 2 times a day, with meals. 180 Tab 3   • glipiZIDE (GLUCOTROL) 5 MG Tab TAKE 1 TABLET BY MOUTH TWICE DAILY 180 Tab 3   • glucose blood strip 1 Strip by Other route 2 Times a Day. 180 Strip 3   • tamsulosin (FLOMAX) 0.4 MG capsule TAKE 1 CAPSULE BY MOUTH EVERY DAY 90 Cap 3   • acetaminophen (TYLENOL) 500 MG Tab Take 500-1,000 mg by mouth every 6 hours as needed.       No current facility-administered medications for this visit.      He  has a past medical history of Diabetes (CMS-HCC) (HCC); Hyperlipidemia; and Hypertension.    Current Outpatient Prescriptions   Medication Sig Dispense Refill   • benazepril (LOTENSIN) 10 MG Tab  "Take 1 Tab by mouth every day. 90 Tab 3   • metFORMIN (GLUCOPHAGE) 500 MG Tab Take 1 Tab by mouth 2 times a day, with meals. 180 Tab 3   • glipiZIDE (GLUCOTROL) 5 MG Tab TAKE 1 TABLET BY MOUTH TWICE DAILY 180 Tab 3   • glucose blood strip 1 Strip by Other route 2 Times a Day. 180 Strip 3   • tamsulosin (FLOMAX) 0.4 MG capsule TAKE 1 CAPSULE BY MOUTH EVERY DAY 90 Cap 3   • acetaminophen (TYLENOL) 500 MG Tab Take 500-1,000 mg by mouth every 6 hours as needed.       No current facility-administered medications for this visit.        Allergies as of 05/24/2018   • (No Known Allergies)       Social History     Social History   • Marital status:      Spouse name: N/A   • Number of children: N/A   • Years of education: N/A     Occupational History   • Not on file.     Social History Main Topics   • Smoking status: Former Smoker     Packs/day: 2.00     Years: 7.00     Quit date: 9/8/1993   • Smokeless tobacco: Never Used   • Alcohol use No   • Drug use: No   • Sexual activity: Yes     Partners: Female      Comment: 2 sons     Other Topics Concern   • Not on file     Social History Narrative   • No narrative on file        Family History   Problem Relation Age of Onset   • Cancer Sister      stomach cancer   • Diabetes Neg Hx    • Heart Disease Neg Hx        Past Surgical History:   Procedure Laterality Date   • CATARACT EXTRACTION WITH IOL         ROS   All systems reviewed are negative except for HPI       Objective:     Blood pressure 126/78, pulse 66, temperature 36.4 °C (97.6 °F), resp. rate 16, height 1.664 m (5' 5.5\"), weight 63.5 kg (140 lb), SpO2 98 %. Body mass index is 22.94 kg/m².   Physical Exam:  Constitutional: Alert, no distress.  Skin: Warm, dry, good turgor, no rashes in visible areas.  Eye: Equal, round and reactive, conjunctiva clear, lids normal.  ENMT: Lips without lesions, good dentition, oropharynx clear.  Neck: Trachea midline, no masses, no thyromegaly. No cervical or supraclavicular " lymphadenopathy  Respiratory: Unlabored respiratory effort, lungs clear to auscultation, no wheezes, no ronchi.  Cardiovascular: Normal S1, S2, no murmur, no edema.  Abdomen: Soft, non-tender, no masses, no hepatosplenomegaly.  Psych: Alert and oriented x3, normal affect and mood.        Assessment and Plan:   The following treatment plan was discussed    1. Prostate cancer (HCC)  Continue to monitor, no further treatment per pt wishes., respect pt decisions ,  - PSA TOTAL + %FREE; Future  - CBC WITH DIFFERENTIAL; Future    2. Type 2 diabetes mellitus without complication, without long-term current use of insulin (HCC)  Well managed, continue same meds. Continue to monitor   - metFORMIN (GLUCOPHAGE) 500 MG Tab; Take 1 Tab by mouth 2 times a day, with meals.  Dispense: 180 Tab; Refill: 3  - HEMOGLOBIN A1C; Future  - COMP METABOLIC PANEL; Future  - LIPID PROFILE; Future  - CBC WITH DIFFERENTIAL; Future  - MICROALBUMIN CREAT RATIO URINE; Future    3. Essential hypertension  Continue to monitor. Continue same meds. Continue to monitor   - benazepril (LOTENSIN) 10 MG Tab; Take 1 Tab by mouth every day.  Dispense: 90 Tab; Refill: 3  - CBC WITH DIFFERENTIAL; Future    4. Mixed hyperlipidemia  No indication for statin as per above, continue to monitor   - LIPID PROFILE; Future      Followup: Return in about 6 months (around 11/24/2018), or if symptoms worsen or fail to improve, for Short.

## 2018-05-24 NOTE — ASSESSMENT & PLAN NOTE
Primary prevention . Refuses statin. With his diagnose and age., no indication for statin, continue to monitor

## 2018-05-24 NOTE — ASSESSMENT & PLAN NOTE
No recent lab work. He is still on same medications. He is on metformin, glipizide. Last a1c 7.4. We will continue to monitor

## 2018-05-24 NOTE — ASSESSMENT & PLAN NOTE
Well controled on benazepril 10 mg daily. He denies chest pain, shortness of breath, leg swelling, palpitation, lightheadedness or blurry vision.

## 2018-05-24 NOTE — ASSESSMENT & PLAN NOTE
I did send pt to urology because PSA was elevated. He had biopsy and diagnose with prostate cancer. He refused surgery and treatment because of the cost. He denies weight loss. He has some lower pelvic pain, but he denies haematuria. He states that he has had happy life until now, and he is 78 y/o. It is possible time for him to go, whenever it will be. He refuses to follow up with urology, family aware of his decisions.

## 2018-05-30 ENCOUNTER — TELEPHONE (OUTPATIENT)
Dept: MEDICAL GROUP | Facility: PHYSICIAN GROUP | Age: 78
End: 2018-05-30

## 2018-05-30 DIAGNOSIS — E11.9 TYPE 2 DIABETES MELLITUS WITHOUT COMPLICATION, WITHOUT LONG-TERM CURRENT USE OF INSULIN (HCC): ICD-10-CM

## 2018-05-30 NOTE — TELEPHONE ENCOUNTER
Received a fax from [employer] stating patient informed them he is taking metformin 500 mg TID - not BID.  Last two office notes state patient is taking BID.  Could we send new Rx for TID if ok to continue TID.

## 2018-08-07 ENCOUNTER — PATIENT OUTREACH (OUTPATIENT)
Dept: HEALTH INFORMATION MANAGEMENT | Facility: OTHER | Age: 78
End: 2018-08-07

## 2018-08-07 NOTE — PROGRESS NOTES
1. Attempt #:Final    2. [facility]Connect Verified: yes    3. Verify PCP: yes    4. Review Care Team: yes    5. WebIZ Checked & Epic Updated: Yes  · WebIZ Recommendations: FLU, PREVNAR (PCV13) , TDAP and SHINGRIX (Shingles)  · Is patient due for Tdap? YES. Patient was not notified of copay/out of pocket cost.  · Is patient due for Shingles? YES. Patient was not notified of copay/out of pocket cost. / Declined Immunizations    6. Communication Preference Obtained: yes    7. Annual Wellness Visit Scheduling  · Scheduling Status:Scheduled     8. Care Coordination Enrollment (Attempt to Enroll in Care Coordination)    9. Care Gap Scheduling (Attempt to Schedule EACH Overdue Care Gap!)     Health Maintenance Due   Topic Date Due   • Annual Wellness Visit  1940   • IMM DTaP/Tdap/Td Vaccine (1 - Tdap) 11/23/1958   • IMM ZOSTER VACCINES (1 of 2) 11/23/1989   • IMM PNEUMOCOCCAL 65+ (ADULT) LOW/MEDIUM RISK SERIES (1 of 2 - PCV13) 11/23/2004   • A1C SCREENING  03/09/2018 / already has orders for labs        Scheduled patient for Annual Wellness Visit and Dexa Scan     10. [employer] Activation: declined    11. [employer] Blanquita: no    12. Virtual Visits: no    13. Opt In to Text Messages: no    Patient was directed to Health and Wellness Website: yes    Screened for [employer] Prescription? yes    Are you a tobacco smoker? no

## 2018-08-16 ENCOUNTER — TELEPHONE (OUTPATIENT)
Dept: MEDICAL GROUP | Facility: PHYSICIAN GROUP | Age: 78
End: 2018-08-16

## 2018-08-16 NOTE — TELEPHONE ENCOUNTER
VOICEMAIL  1. Caller Name: Wilmar Zaragoza                        Call Back Number: [phone] (home)       2. Message: Called and left patient a VM. Left VM in Bermudian. Asking patient to call back to confirm if he is coming. Also gave him my direct line [phone] to confirm with him if he will need the  line.     3. Patient approves office to leave a detailed voicemail/MyChart message: N\A

## 2018-08-29 ENCOUNTER — OFFICE VISIT (OUTPATIENT)
Dept: MEDICAL GROUP | Facility: PHYSICIAN GROUP | Age: 78
End: 2018-08-29
Payer: MEDICARE

## 2018-08-29 VITALS
HEART RATE: 70 BPM | BODY MASS INDEX: 22.98 KG/M2 | HEIGHT: 66 IN | TEMPERATURE: 98 F | RESPIRATION RATE: 16 BRPM | DIASTOLIC BLOOD PRESSURE: 68 MMHG | WEIGHT: 143 LBS | SYSTOLIC BLOOD PRESSURE: 132 MMHG | OXYGEN SATURATION: 98 %

## 2018-08-29 DIAGNOSIS — C61 PROSTATE CANCER (HCC): ICD-10-CM

## 2018-08-29 DIAGNOSIS — E78.2 MIXED HYPERLIPIDEMIA: ICD-10-CM

## 2018-08-29 DIAGNOSIS — E11.9 TYPE 2 DIABETES MELLITUS WITHOUT COMPLICATION, WITHOUT LONG-TERM CURRENT USE OF INSULIN (HCC): ICD-10-CM

## 2018-08-29 DIAGNOSIS — Z00.00 MEDICARE ANNUAL WELLNESS VISIT, SUBSEQUENT: Primary | ICD-10-CM

## 2018-08-29 DIAGNOSIS — M25.511 CHRONIC RIGHT SHOULDER PAIN: ICD-10-CM

## 2018-08-29 DIAGNOSIS — I10 ESSENTIAL HYPERTENSION: ICD-10-CM

## 2018-08-29 DIAGNOSIS — G89.29 CHRONIC RIGHT SHOULDER PAIN: ICD-10-CM

## 2018-08-29 DIAGNOSIS — N40.1 BENIGN NON-NODULAR PROSTATIC HYPERPLASIA WITH LOWER URINARY TRACT SYMPTOMS: ICD-10-CM

## 2018-08-29 PROCEDURE — G0439 PPPS, SUBSEQ VISIT: HCPCS | Performed by: INTERNAL MEDICINE

## 2018-08-29 ASSESSMENT — ENCOUNTER SYMPTOMS: GENERAL WELL-BEING: GOOD

## 2018-08-29 ASSESSMENT — PAIN SCALES - GENERAL: PAINLEVEL: NO PAIN

## 2018-08-29 ASSESSMENT — PATIENT HEALTH QUESTIONNAIRE - PHQ9: CLINICAL INTERPRETATION OF PHQ2 SCORE: 0

## 2018-08-29 ASSESSMENT — ACTIVITIES OF DAILY LIVING (ADL): BATHING_REQUIRES_ASSISTANCE: 0

## 2018-08-29 NOTE — PROGRESS NOTES
Chief Complaint   Patient presents with   • Annual Wellness Visit      used          HPI:  Wilmar is a 77 y.o. here for Medicare Annual Wellness Visit        Patient Active Problem List    Diagnosis Date Noted   • Prostate cancer (HCC) 05/24/2018   • Chronic right shoulder pain 01/16/2018   • Hyperlipidemia 10/13/2017   • Type 2 diabetes mellitus without complication (HCC) 09/08/2017   • Essential hypertension 09/08/2017   • Benign non-nodular prostatic hyperplasia with lower urinary tract symptoms 09/08/2017       Current Outpatient Prescriptions   Medication Sig Dispense Refill   • metFORMIN (GLUCOPHAGE) 500 MG Tab Take 1 Tab by mouth 3 times a day, with meals. 270 Tab 1   • benazepril (LOTENSIN) 10 MG Tab Take 1 Tab by mouth every day. 90 Tab 3   • glipiZIDE (GLUCOTROL) 5 MG Tab TAKE 1 TABLET BY MOUTH TWICE DAILY 180 Tab 3   • glucose blood strip 1 Strip by Other route 2 Times a Day. 180 Strip 3   • tamsulosin (FLOMAX) 0.4 MG capsule TAKE 1 CAPSULE BY MOUTH EVERY DAY 90 Cap 3   • acetaminophen (TYLENOL) 500 MG Tab Take 500-1,000 mg by mouth every 6 hours as needed.       No current facility-administered medications for this visit.         Patient is taking medications as noted in medication list.  Current supplements as per medication list.     Allergies: Patient has no known allergies.    Current social contact/activities: spends time with family      Is patient current with immunizations? No, due for PREVNAR (PCV13) , TDAP and SHINGRIX (Shingles). Patient is interested in receiving NONE today.    He  reports that he quit smoking about 24 years ago. He has a 14.00 pack-year smoking history. He has never used smokeless tobacco. He reports that he does not drink alcohol or use drugs.  Counseling given: Not Answered        DPA/Advanced directive: Patient does not have an Advanced Directive.  A packet and workshop information was given on Advanced Directives.    ROS:    Gait: Uses no assistive  device   Ostomy: No  Other tubes: No   Amputations: No   Chronic oxygen use No   Last eye exam 2015   Wears hearing aids: No   : Denies any urinary leakage during the last 6 months      Screening:    DIABETES    Has patient ever had diabetes education? No, patient is NOT interested.        Depression Screening    Little interest or pleasure in doing things?  0 - not at all  Feeling down, depressed, or hopeless? 0 - not at all  Patient Health Questionnaire Score: 0    If depressive symptoms identified deferred to follow up visit unless specifically addressed in assessment and plan.    Interpretation of PHQ-9 Total Score   Score Severity   1-4 No Depression   5-9 Mild Depression   10-14 Moderate Depression   15-19 Moderately Severe Depression   20-27 Severe Depression    Screening for Cognitive Impairment    Three Minute Recall (leader, season, table)  3/3    Jose clock face with all 12 numbers and set the hands to show 10 past 11.  Yes 5/5  If cognitive concerns identified, deferred for follow up unless specifically addressed in assessment and plan.    Fall Risk Assessment    Has the patient had two or more falls in the last year or any fall with injury in the last year?  No  If fall risk identified, deferred for follow up unless specifically addressed in assessment and plan.    Safety Assessment    Throw rugs on floor.  No  Handrails on all stairs.  Yes  Good lighting in all hallways.  Yes  Difficulty hearing.  No  Patient counseled about all safety risks that were identified.    Functional Assessment ADLs    Are there any barriers preventing you from cooking for yourself or meeting nutritional needs?  No.    Are there any barriers preventing you from driving safely or obtaining transportation?  No.    Are there any barriers preventing you from using a telephone or calling for help?  No.    Are there any barriers preventing you from shopping?  No.    Are there any barriers preventing you from taking care of your  own finances?  No.    Are there any barriers preventing you from managing your medications?  No.    Are there any barriers preventing you from showering, bathing or dressing yourself?  No.    Are you currently engaging in any exercise or physical activity?  Yes.  Walking 3 times a week   What is your perception of your health?  Good.    Health Maintenance Summary                Annual Wellness Visit Overdue 1940     IMM HEP B VACCINE Overdue 11/23/1958     IMM DTaP/Tdap/Td Vaccine Overdue 11/23/1958     IMM ZOSTER VACCINES Overdue 11/23/1989     IMM PNEUMOCOCCAL 65+ (ADULT) LOW/MEDIUM RISK SERIES Overdue 11/23/2004     A1C SCREENING Overdue 3/9/2018      Done 9/9/2017 HEMOGLOBIN A1C     IMM INFLUENZA Next Due 9/1/2018      Done 9/8/2017 Imm Admin: Influenza Vaccine Adult HD    FASTING LIPID PROFILE Next Due 9/9/2018      Done 9/9/2017 LIPID PROFILE     URINE ACR / MICROALBUMIN Next Due 9/9/2018      Done 9/9/2017 MICROALBUMIN CREAT RATIO URINE    SERUM CREATININE Next Due 9/9/2018      Done 9/9/2017 COMP METABOLIC PANEL     DIABETES MONOFILAMENT / LE EXAM Next Due 10/13/2018      Done 10/13/2017 AMB DIABETIC MONOFILAMENT LOWER EXTREMITY EXAM    RETINAL SCREENING Next Due 12/12/2018      Done 12/12/2017 REFERRAL FOR RETINAL SCREENING EXAM          Patient Care Team:  Gera Dee M.D. as PCP - General (Internal Medicine)  Mansoor Kerr M.D. as Consulting Physician (Ophthalmology)  [facility] as Consulting Physician    Social History   Substance Use Topics   • Smoking status: Former Smoker     Packs/day: 2.00     Years: 7.00     Quit date: 9/8/1993   • Smokeless tobacco: Never Used   • Alcohol use No     Family History   Problem Relation Age of Onset   • Cancer Sister         stomach cancer   • Alcohol/Drug Brother    • Diabetes Sister    • Heart Disease Neg Hx      He  has a past medical history of Diabetes (HCC); Hyperlipidemia; and Hypertension.   Past Surgical History:   Procedure Laterality  "Date   • CATARACT EXTRACTION WITH IOL             Exam:     Blood pressure 132/68, pulse 70, temperature 36.7 °C (98 °F), resp. rate 16, height 1.664 m (5' 5.5\"), weight 64.9 kg (143 lb), SpO2 98 %. Body mass index is 23.43 kg/m².    Hearing good.    Dentition fair  Alert, oriented in no acute distress.  Eye contact is good, speech goal directed, affect calm      Assessment and Plan. The following treatment and monitoring plan is recommended:      Type 2 diabetes mellitus without complication (CMS-HCC)  No recent lab work. He is still on same medications. He is on metformin, glipizide. Last a1c 7.4. We will continue to monitor     Essential hypertension  Well controled on benazepril 10 mg daily. He denies chest pain, shortness of breath, leg swelling, palpitation, lightheadedness or blurry vision.    Benign non-nodular prostatic hyperplasia with lower urinary tract symptoms  Chronic, stable. He is on tamsulosin 0.4 mg daily     Hyperlipidemia  Chronic, primary prevention. Refuses statin. Pt has prostate cancer. He is above age 74, I agree with pt no clear indication of benefits from statin at this time     Chronic right shoulder pain  Chronic, stable. He reports sometimes he has right shoulder pain, sometimes else left knee pain if he works a lot. No swelling, no fever. Range of motion intact. No trauma.     Prostate cancer (HCC)  Dx 05/2018. He had refused treatment, because it is expensive to pay copayment and see urologist all the time. He states that he has had happy life until now, and he is 80 y/o. It is possible time for him to go, whenever it will be. He refuses to follow up with urology, family aware of his decisions.         Services suggested: No services needed at this time  Health Care Screening recommendations as per orders if indicated.  Referrals offered: PT/OT/Nutrition counseling/Behavioral Health/Smoking cessation as per orders if indicated.    Discussion today about general wellness and lifestyle " habits:    · Prevent falls and reduce trip hazards; Cautioned about securing or removing rugs.  · Have a working fire alarm and carbon monoxide detector;   · Engage in regular physical activity and social activities       Follow-up: Return in about 4 weeks (around 9/26/2018), or if symptoms worsen or fail to improve, for Short, follow up on DMII, follow up on lab review.

## 2018-08-29 NOTE — ASSESSMENT & PLAN NOTE
Chronic, primary prevention. Refuses statin. Pt has prostate cancer. He is above age 74, I agree with pt no clear indication of benefits from statin at this time

## 2018-08-29 NOTE — ASSESSMENT & PLAN NOTE
Chronic, stable. He reports sometimes he has right shoulder pain, sometimes else left knee pain if he works a lot. No swelling, no fever. Range of motion intact. No trauma.

## 2018-10-01 ENCOUNTER — HOSPITAL ENCOUNTER (OUTPATIENT)
Dept: LAB | Facility: MEDICAL CENTER | Age: 78
End: 2018-10-01
Attending: INTERNAL MEDICINE
Payer: MEDICARE

## 2018-10-01 DIAGNOSIS — E11.9 TYPE 2 DIABETES MELLITUS WITHOUT COMPLICATION, WITHOUT LONG-TERM CURRENT USE OF INSULIN (HCC): ICD-10-CM

## 2018-10-01 DIAGNOSIS — I10 ESSENTIAL HYPERTENSION: ICD-10-CM

## 2018-10-01 DIAGNOSIS — C61 PROSTATE CANCER (HCC): ICD-10-CM

## 2018-10-01 DIAGNOSIS — E78.2 MIXED HYPERLIPIDEMIA: ICD-10-CM

## 2018-10-01 LAB
ALBUMIN SERPL BCP-MCNC: 4.3 G/DL (ref 3.2–4.9)
ALBUMIN/GLOB SERPL: 1.3 G/DL
ALP SERPL-CCNC: 57 U/L (ref 30–99)
ALT SERPL-CCNC: 17 U/L (ref 2–50)
ANION GAP SERPL CALC-SCNC: 7 MMOL/L (ref 0–11.9)
AST SERPL-CCNC: 17 U/L (ref 12–45)
BASOPHILS # BLD AUTO: 0.7 % (ref 0–1.8)
BASOPHILS # BLD: 0.03 K/UL (ref 0–0.12)
BILIRUB SERPL-MCNC: 0.6 MG/DL (ref 0.1–1.5)
BUN SERPL-MCNC: 19 MG/DL (ref 8–22)
CALCIUM SERPL-MCNC: 9.5 MG/DL (ref 8.5–10.5)
CHLORIDE SERPL-SCNC: 103 MMOL/L (ref 96–112)
CHOLEST SERPL-MCNC: 199 MG/DL (ref 100–199)
CO2 SERPL-SCNC: 29 MMOL/L (ref 20–33)
CREAT SERPL-MCNC: 0.95 MG/DL (ref 0.5–1.4)
CREAT UR-MCNC: 53.2 MG/DL
EOSINOPHIL # BLD AUTO: 0.14 K/UL (ref 0–0.51)
EOSINOPHIL NFR BLD: 3.2 % (ref 0–6.9)
ERYTHROCYTE [DISTWIDTH] IN BLOOD BY AUTOMATED COUNT: 45.1 FL (ref 35.9–50)
EST. AVERAGE GLUCOSE BLD GHB EST-MCNC: 154 MG/DL
FASTING STATUS PATIENT QL REPORTED: NORMAL
GLOBULIN SER CALC-MCNC: 3.3 G/DL (ref 1.9–3.5)
GLUCOSE SERPL-MCNC: 149 MG/DL (ref 65–99)
HBA1C MFR BLD: 7 % (ref 0–5.6)
HCT VFR BLD AUTO: 42.7 % (ref 42–52)
HDLC SERPL-MCNC: 50 MG/DL
HGB BLD-MCNC: 14.5 G/DL (ref 14–18)
IMM GRANULOCYTES # BLD AUTO: 0.01 K/UL (ref 0–0.11)
IMM GRANULOCYTES NFR BLD AUTO: 0.2 % (ref 0–0.9)
LDLC SERPL CALC-MCNC: 114 MG/DL
LYMPHOCYTES # BLD AUTO: 1.63 K/UL (ref 1–4.8)
LYMPHOCYTES NFR BLD: 37 % (ref 22–41)
MCH RBC QN AUTO: 30.5 PG (ref 27–33)
MCHC RBC AUTO-ENTMCNC: 34 G/DL (ref 33.7–35.3)
MCV RBC AUTO: 89.7 FL (ref 81.4–97.8)
MICROALBUMIN UR-MCNC: <0.7 MG/DL
MICROALBUMIN/CREAT UR: NORMAL MG/G (ref 0–30)
MONOCYTES # BLD AUTO: 0.34 K/UL (ref 0–0.85)
MONOCYTES NFR BLD AUTO: 7.7 % (ref 0–13.4)
NEUTROPHILS # BLD AUTO: 2.26 K/UL (ref 1.82–7.42)
NEUTROPHILS NFR BLD: 51.2 % (ref 44–72)
NRBC # BLD AUTO: 0 K/UL
NRBC BLD-RTO: 0 /100 WBC
PLATELET # BLD AUTO: 200 K/UL (ref 164–446)
PMV BLD AUTO: 11 FL (ref 9–12.9)
POTASSIUM SERPL-SCNC: 4.4 MMOL/L (ref 3.6–5.5)
PROT SERPL-MCNC: 7.6 G/DL (ref 6–8.2)
RBC # BLD AUTO: 4.76 M/UL (ref 4.7–6.1)
SODIUM SERPL-SCNC: 139 MMOL/L (ref 135–145)
TRIGL SERPL-MCNC: 177 MG/DL (ref 0–149)
WBC # BLD AUTO: 4.4 K/UL (ref 4.8–10.8)

## 2018-10-01 PROCEDURE — 82043 UR ALBUMIN QUANTITATIVE: CPT

## 2018-10-01 PROCEDURE — 83036 HEMOGLOBIN GLYCOSYLATED A1C: CPT

## 2018-10-01 PROCEDURE — 80061 LIPID PANEL: CPT

## 2018-10-01 PROCEDURE — 36415 COLL VENOUS BLD VENIPUNCTURE: CPT

## 2018-10-01 PROCEDURE — 82570 ASSAY OF URINE CREATININE: CPT

## 2018-10-01 PROCEDURE — 80053 COMPREHEN METABOLIC PANEL: CPT

## 2018-10-01 PROCEDURE — 85025 COMPLETE CBC W/AUTO DIFF WBC: CPT

## 2018-10-01 PROCEDURE — 84153 ASSAY OF PSA TOTAL: CPT

## 2018-10-01 PROCEDURE — 84154 ASSAY OF PSA FREE: CPT

## 2018-10-03 LAB
PSA FREE MFR SERPL: 15 %
PSA FREE SERPL-MCNC: 1 NG/ML
PSA SERPL-MCNC: 6.5 NG/ML (ref 0–4)

## 2018-10-05 ENCOUNTER — TELEPHONE (OUTPATIENT)
Dept: MEDICAL GROUP | Facility: PHYSICIAN GROUP | Age: 78
End: 2018-10-05

## 2018-10-05 NOTE — TELEPHONE ENCOUNTER
----- Message from Gera Dee M.D. sent at 10/5/2018 12:59 PM PDT -----  Please let the patient know that the labs look good. We can discuss at their next appointment. Thank you  Gera Dee M.D.

## 2018-10-05 NOTE — TELEPHONE ENCOUNTER
Phone Number Called: [phone] (home)       Message: Left voice mail for the pt re results. and Informed pt to call back     Left Message for patient to call back: yes

## 2018-10-05 NOTE — PROGRESS NOTES
Please let the patient know that the labs look good. We can discuss at their next appointment. Thank you  Gera Dee M.D.

## 2018-10-09 ENCOUNTER — OFFICE VISIT (OUTPATIENT)
Dept: MEDICAL GROUP | Facility: PHYSICIAN GROUP | Age: 78
End: 2018-10-09
Payer: MEDICARE

## 2018-10-09 VITALS
TEMPERATURE: 97.8 F | WEIGHT: 143 LBS | BODY MASS INDEX: 22.98 KG/M2 | RESPIRATION RATE: 16 BRPM | HEART RATE: 71 BPM | OXYGEN SATURATION: 99 % | DIASTOLIC BLOOD PRESSURE: 60 MMHG | HEIGHT: 66 IN | SYSTOLIC BLOOD PRESSURE: 130 MMHG

## 2018-10-09 DIAGNOSIS — I10 ESSENTIAL HYPERTENSION: ICD-10-CM

## 2018-10-09 DIAGNOSIS — E78.2 MIXED HYPERLIPIDEMIA: ICD-10-CM

## 2018-10-09 DIAGNOSIS — M25.511 CHRONIC RIGHT SHOULDER PAIN: ICD-10-CM

## 2018-10-09 DIAGNOSIS — C61 PROSTATE CANCER (HCC): ICD-10-CM

## 2018-10-09 DIAGNOSIS — E11.9 TYPE 2 DIABETES MELLITUS WITHOUT COMPLICATION, WITHOUT LONG-TERM CURRENT USE OF INSULIN (HCC): ICD-10-CM

## 2018-10-09 DIAGNOSIS — G89.29 CHRONIC RIGHT SHOULDER PAIN: ICD-10-CM

## 2018-10-09 DIAGNOSIS — N40.1 BENIGN NON-NODULAR PROSTATIC HYPERPLASIA WITH LOWER URINARY TRACT SYMPTOMS: ICD-10-CM

## 2018-10-09 PROCEDURE — 99214 OFFICE O/P EST MOD 30 MIN: CPT | Performed by: INTERNAL MEDICINE

## 2018-10-09 NOTE — ASSESSMENT & PLAN NOTE
a1c 7.0 (before 7.4). He is on metformin 500 mg TID. Glipizide 5 mg BID. He is on ace and asa. No complication. Compliant with medication. He is up to date with retinal screening, diabetes monofilament examination. Continue to monitor. Continue same regime.

## 2018-10-09 NOTE — ASSESSMENT & PLAN NOTE
He reports that he continues to have some shoulder pain again. Range of motion intact. No trauma., he is not using anything. He tells me that pain is mild. He refuses to get imaging done

## 2018-10-09 NOTE — PROGRESS NOTES
Subjective:   Wilmar Zaragoza is a 77 y.o. male here today for lab work review, prostate cancer, hypertension, diabetes     Type 2 diabetes mellitus without complication (CMS-Formerly [facility])  a1c 7.0 (before 7.4). He is on metformin 500 mg TID. Glipizide 5 mg BID. He is on ace and asa. No complication. Compliant with medication. He is up to date with retinal screening, diabetes monofilament examination. Continue to monitor. Continue same regime.     Essential hypertension  BP well controled. Rechecked BP personally and it was 130/60.  He is on benazepril 10 mg daily.  No side effects.  He denies chest pain, shortness of breath, leg swelling, lightheadedness or blurry vision    Benign non-nodular prostatic hyperplasia with lower urinary tract symptoms  Symptoms are well managed on tamsulosin 0.4 mg daily.     Hyperlipidemia  Primary prevention. Lipid panel 10/2018.  he refuses to take statin.  Continue to monitor     Chronic right shoulder pain  He reports that he continues to have some shoulder pain again. Range of motion intact. No trauma., he is not using anything. He tells me that pain is mild. He refuses to get imaging done     Prostate cancer (Formerly [facility])  PSA stable. No symptoms. He refuses to get treatment, or follow up with urologist because he cannot afford copayment.        Current medicines (including changes today)  Current Outpatient Prescriptions   Medication Sig Dispense Refill   • metFORMIN (GLUCOPHAGE) 500 MG Tab Take 1 Tab by mouth 3 times a day, with meals. 270 Tab 1   • benazepril (LOTENSIN) 10 MG Tab Take 1 Tab by mouth every day. 90 Tab 3   • glipiZIDE (GLUCOTROL) 5 MG Tab TAKE 1 TABLET BY MOUTH TWICE DAILY 180 Tab 3   • glucose blood strip 1 Strip by Other route 2 Times a Day. 180 Strip 3   • tamsulosin (FLOMAX) 0.4 MG capsule TAKE 1 CAPSULE BY MOUTH EVERY DAY 90 Cap 3   • acetaminophen (TYLENOL) 500 MG Tab Take 500-1,000 mg by mouth every 6 hours as needed.       No current facility-administered medications for  "this visit.      He  has a past medical history of Diabetes (HCC); Hyperlipidemia; and Hypertension.    Current Outpatient Prescriptions   Medication Sig Dispense Refill   • metFORMIN (GLUCOPHAGE) 500 MG Tab Take 1 Tab by mouth 3 times a day, with meals. 270 Tab 1   • benazepril (LOTENSIN) 10 MG Tab Take 1 Tab by mouth every day. 90 Tab 3   • glipiZIDE (GLUCOTROL) 5 MG Tab TAKE 1 TABLET BY MOUTH TWICE DAILY 180 Tab 3   • glucose blood strip 1 Strip by Other route 2 Times a Day. 180 Strip 3   • tamsulosin (FLOMAX) 0.4 MG capsule TAKE 1 CAPSULE BY MOUTH EVERY DAY 90 Cap 3   • acetaminophen (TYLENOL) 500 MG Tab Take 500-1,000 mg by mouth every 6 hours as needed.       No current facility-administered medications for this visit.        Allergies as of 10/09/2018   • (No Known Allergies)       Social History     Social History   • Marital status:      Spouse name: N/A   • Number of children: N/A   • Years of education: N/A     Occupational History   • Not on file.     Social History Main Topics   • Smoking status: Former Smoker     Packs/day: 2.00     Years: 7.00     Quit date: 9/8/1993   • Smokeless tobacco: Never Used   • Alcohol use No   • Drug use: No   • Sexual activity: Yes     Partners: Female      Comment: 2 sons     Other Topics Concern   • Not on file     Social History Narrative   • No narrative on file        Family History   Problem Relation Age of Onset   • Cancer Sister         stomach cancer   • Alcohol/Drug Brother    • Diabetes Sister    • Heart Disease Neg Hx        Past Surgical History:   Procedure Laterality Date   • CATARACT EXTRACTION WITH IOL         ROS   All systems reviewed are negative except for HPI       Objective:     Blood pressure 130/60, pulse 71, temperature 36.6 °C (97.8 °F), temperature source Temporal, resp. rate 16, height 1.664 m (5' 5.5\"), weight 64.9 kg (143 lb), SpO2 99 %. Body mass index is 23.43 kg/m².   Physical Exam:  Constitutional: Alert, no distress.  Skin: Warm, " dry, good turgor, no rashes in visible areas.  Eye: Equal, round and reactive, conjunctiva clear, lids normal.  ENMT: Lips without lesions, good dentition, oropharynx clear.  Neck: Trachea midline, no masses, no thyromegaly. No cervical or supraclavicular lymphadenopathy  Respiratory: Unlabored respiratory effort, lungs clear to auscultation, no wheezes, no ronchi.  Cardiovascular: Normal S1, S2, no murmur, no edema.  Abdomen: Soft, non-tender, no masses, no hepatosplenomegaly.  Psych: Alert and oriented x3, normal affect and mood.        Assessment and Plan:   The following treatment plan was discussed    1. Chronic right shoulder pain  I did advise pt to use tylenol prn. I did advise to call us if worse. Range of motion intact     2. Type 2 diabetes mellitus without complication, without long-term current use of insulin (HCC)  Continue same treatment, continue to monitor   - COMP METABOLIC PANEL; Future  - HEMOGLOBIN A1C; Future  - CBC WITH DIFFERENTIAL; Future  - MICROALBUMIN CREAT RATIO URINE; Future  - metFORMIN (GLUCOPHAGE) 500 MG Tab; Take 1 Tab by mouth 3 times a day, with meals.  Dispense: 270 Tab; Refill: 1    3. Benign non-nodular prostatic hyperplasia with lower urinary tract symptoms  Continue same treatment     4. Essential hypertension  Continue same treatment, continue to monitor   - COMP METABOLIC PANEL; Future  - LIPID PROFILE; Future    5. Mixed hyperlipidemia  Continue to monitor. He refuses statin   - LIPID PROFILE; Future    6. Prostate cancer (HCC)  Continue to monitor. Social service consultation if pt can be provided for more finacial resources   - PSA TOTAL + %FREE; Future      Followup: Return if symptoms worsen or fail to improve, for Short.

## 2018-10-09 NOTE — ASSESSMENT & PLAN NOTE
BP well controled. Rechecked BP personally and it was 130/60.  He is on benazepril 10 mg daily.  No side effects.  He denies chest pain, shortness of breath, leg swelling, lightheadedness or blurry vision

## 2018-10-09 NOTE — ASSESSMENT & PLAN NOTE
PSA stable. No symptoms. He refuses to get treatment, or follow up with urologist because he cannot afford copayment.

## 2019-01-15 ENCOUNTER — APPOINTMENT (OUTPATIENT)
Dept: MEDICAL GROUP | Facility: PHYSICIAN GROUP | Age: 79
End: 2019-01-15
Payer: MEDICARE

## 2019-01-15 RX ORDER — GLIPIZIDE 5 MG/1
TABLET ORAL
Qty: 180 TAB | Refills: 1 | Status: SHIPPED | OUTPATIENT
Start: 2019-01-15 | End: 2019-06-10 | Stop reason: SDUPTHER

## 2019-01-31 ENCOUNTER — OFFICE VISIT (OUTPATIENT)
Dept: MEDICAL GROUP | Facility: PHYSICIAN GROUP | Age: 79
End: 2019-01-31
Payer: MEDICARE

## 2019-01-31 VITALS
DIASTOLIC BLOOD PRESSURE: 76 MMHG | HEART RATE: 68 BPM | TEMPERATURE: 97.5 F | SYSTOLIC BLOOD PRESSURE: 154 MMHG | BODY MASS INDEX: 23.14 KG/M2 | OXYGEN SATURATION: 98 % | RESPIRATION RATE: 14 BRPM | HEIGHT: 66 IN | WEIGHT: 144 LBS

## 2019-01-31 DIAGNOSIS — N40.1 BENIGN NON-NODULAR PROSTATIC HYPERPLASIA WITH LOWER URINARY TRACT SYMPTOMS: ICD-10-CM

## 2019-01-31 DIAGNOSIS — C61 PROSTATE CANCER (HCC): ICD-10-CM

## 2019-01-31 DIAGNOSIS — E78.2 MIXED HYPERLIPIDEMIA: ICD-10-CM

## 2019-01-31 DIAGNOSIS — I10 ESSENTIAL HYPERTENSION: ICD-10-CM

## 2019-01-31 DIAGNOSIS — G89.29 CHRONIC RIGHT SHOULDER PAIN: ICD-10-CM

## 2019-01-31 DIAGNOSIS — E11.9 TYPE 2 DIABETES MELLITUS WITHOUT COMPLICATION, WITHOUT LONG-TERM CURRENT USE OF INSULIN (HCC): ICD-10-CM

## 2019-01-31 DIAGNOSIS — M25.511 CHRONIC RIGHT SHOULDER PAIN: ICD-10-CM

## 2019-01-31 PROCEDURE — 99214 OFFICE O/P EST MOD 30 MIN: CPT | Performed by: INTERNAL MEDICINE

## 2019-01-31 RX ORDER — BENAZEPRIL HYDROCHLORIDE 20 MG/1
20 TABLET ORAL DAILY
Qty: 90 TAB | Refills: 3 | Status: SHIPPED | OUTPATIENT
Start: 2019-01-31 | End: 2019-06-20 | Stop reason: SDUPTHER

## 2019-01-31 ASSESSMENT — PATIENT HEALTH QUESTIONNAIRE - PHQ9: CLINICAL INTERPRETATION OF PHQ2 SCORE: 0

## 2019-01-31 NOTE — PROGRESS NOTES
Subjective:     Wilmar Zaragoza is a 78 y.o. male here today for diabetes, hypertension and Annual Health Assessment.    Type 2 diabetes mellitus without complication (CMS-HCC)  Chronic condition, stable.  He is on metformin 500 mg 3 times daily, glipizide 5 mg BID.  He is on ACE inhibitor's, aspirin 81 mg daily.  No complication.  She is compliant with medication.  Is up-to-date with retinal screening.  Diabetic monofilament examination next appointment.  He refuses immunizations    Essential hypertension  Blood pressure elevated.  He denies chest pain, shortness of breath, leg swelling, blurry vision or lightheadedness.  He is currently on benazepril 10 mg daily, he reports compliance with medication.  He he tells me that his daughter check blood pressure at home and is around 130/70    Benign non-nodular prostatic hyperplasia with lower urinary tract symptoms  Chronic, stable. Symptoms are well managed on tamsulosin 0.4 mg daily.     Hyperlipidemia  Primary prevention. Lipid panel 10/2018.  he refuses to take statin.  Continue to monitor     Chronic right shoulder pain  Stable, not bothering significantly     Prostate cancer (HCC)  PSA stable. No symptoms. He refuses to get treatment, or follow up with urologist because he cannot afford copayment.       Health Maintenance Summary                IMM HEP B VACCINE Overdue 11/23/1958     IMM DTaP/Tdap/Td Vaccine Overdue 11/23/1958     IMM ZOSTER VACCINES Overdue 11/23/1989     IMM PNEUMOCOCCAL 65+ (ADULT) LOW/MEDIUM RISK SERIES Overdue 11/23/2004     DIABETES MONOFILAMENT / LE EXAM Overdue 10/13/2018      Done 10/13/2017 AMB DIABETIC MONOFILAMENT LOWER EXTREMITY EXAM    RETINAL SCREENING Overdue 12/12/2018      Done 12/12/2017 REFERRAL FOR RETINAL SCREENING EXAM    IMM INFLUENZA Postponed 10/8/2019 Originally 9/1/2018. Patient Refused     Done 9/8/2017 Imm Admin: Influenza Vaccine Adult HD    A1C SCREENING Next Due 4/1/2019      Done 10/1/2018 HEMOGLOBIN A1C       Patient has more history with this topic...    Annual Wellness Visit Next Due 8/30/2019      Done 8/29/2018 Visit Dx: Medicare annual wellness visit, subsequent    FASTING LIPID PROFILE Next Due 10/1/2019      Done 10/1/2018 LIPID PROFILE      Patient has more history with this topic...    URINE ACR / MICROALBUMIN Next Due 10/1/2019      Done 10/1/2018 MICROALBUMIN CREAT RATIO URINE     Patient has more history with this topic...    SERUM CREATININE Next Due 10/1/2019      Done 10/1/2018 COMP METABOLIC PANEL      Patient has more history with this topic...           Annual Health Assessment Questions:     1.  Are you currently engaging in any exercise or physical activity? Yes, walking every day     2.  How would you describe your mood or emotional well-being today? good    3.  Have you had any falls in the last year? No    4.  Have you noticed any problems with your balance or had difficulty walking? No    5.  In the last six months have you experienced any leakage of urine? No    6. DPA/Advanced Directive: Patient does not have an Advanced Directive.  A packet and workshop information was given on Advanced Directives.    Current medicines (including changes today)  Current Outpatient Prescriptions   Medication Sig Dispense Refill   • benazepril (LOTENSIN) 20 MG Tab Take 1 Tab by mouth every day. 90 Tab 3   • glipiZIDE (GLUCOTROL) 5 MG Tab TAKE 1 TABLET BY MOUTH TWICE DAILY 180 Tab 1   • metFORMIN (GLUCOPHAGE) 500 MG Tab Take 1 Tab by mouth 3 times a day, with meals. 270 Tab 1   • glucose blood strip 1 Strip by Other route 2 Times a Day. 180 Strip 3   • tamsulosin (FLOMAX) 0.4 MG capsule TAKE 1 CAPSULE BY MOUTH EVERY DAY 90 Cap 3   • acetaminophen (TYLENOL) 500 MG Tab Take 500-1,000 mg by mouth every 6 hours as needed.       No current facility-administered medications for this visit.        He  has a past medical history of Diabetes (HCC); Hyperlipidemia; and Hypertension.    Patient has no allergy information on  "record.    He  reports that he quit smoking about 25 years ago. He has a 14.00 pack-year smoking history. He has never used smokeless tobacco. He reports that he does not drink alcohol or use drugs.  Counseling given: Not Answered      ROS   No chest pain, no shortness of breath, no abdominal pain.     Objective:     Physical Exam:  Blood pressure 154/76, pulse 68, temperature 36.4 °C (97.5 °F), resp. rate 14, height 1.664 m (5' 5.5\"), weight 65.3 kg (144 lb), SpO2 98 %. Body mass index is 23.6 kg/m².   Constitutional: Alert, no distress.  Skin: Warm, dry, good turgor, no rashes in visible areas.  Eye: Equal, round and reactive, conjunctiva clear, lids normal.  ENMT: Lips without lesions, good dentition, oropharynx clear.  Neck: Trachea midline, no masses, no thyromegaly. No cervical or supraclavicular lymphadenopathy.  Respiratory: Unlabored respiratory effort, lungs clear to auscultation, no wheezes, no rhonchi.  Cardiovascular: Normal S1, S2, no murmur, no edema.  Abdomen: Soft, non-tender, no masses, no hepatosplenomegaly.  Psych: Alert and oriented x3, normal affect and mood.    Assessment and Plan:     1. Essential hypertension  Increase benazepril to 20 mg daily.  Continue to monitor.  Consider adding amlodipine at next appointment    2. Type 2 diabetes mellitus without complication, without long-term current use of insulin (HCC)  Continue to monitor.  Diabetic monofilament at next appoint    3. Prostate cancer (HCC)  Continue to monitor.  No further intervention at this time per pt request     4. Benign non-nodular prostatic hyperplasia with lower urinary tract symptoms  Stable. Continue same treatment     5. Mixed hyperlipidemia  continue to monitor. Refuses statin       Discussion today about general wellness and lifestyle habits:    · Engage in regular physical activity and social activities.  · Prevent falls and reduce trip hazards; using ambulatory aides, hearing and vision testing if " appropriate.  · Steps to improve urinary incontinence.  · Advanced care planning.    Follow-Up: Return in about 4 weeks (around 2/28/2019), or if symptoms worsen or fail to improve, for Short.         PLEASE NOTE: This dictation was created using voice recognition software. I have made every reasonable attempt to correct obvious errors, but I expect that there are errors of grammar and possibly content that I did not discover before finalizing the note.

## 2019-02-01 NOTE — ASSESSMENT & PLAN NOTE
Blood pressure elevated.  He denies chest pain, shortness of breath, leg swelling, blurry vision or lightheadedness.  He is currently on benazepril 10 mg daily, he reports compliance with medication.  He he tells me that his daughter check blood pressure at home and is around 130/70

## 2019-02-01 NOTE — ASSESSMENT & PLAN NOTE
Chronic condition, stable.  He is on metformin 500 mg 3 times daily, glipizide 5 mg BID.  He is on ACE inhibitor's, aspirin 81 mg daily.  No complication.  She is compliant with medication.  Is up-to-date with retinal screening.  Diabetic monofilament examination next appointment.  He refuses immunizations

## 2019-02-20 ENCOUNTER — OFFICE VISIT (OUTPATIENT)
Dept: MEDICAL GROUP | Facility: PHYSICIAN GROUP | Age: 79
End: 2019-02-20
Payer: MEDICARE

## 2019-02-20 VITALS
DIASTOLIC BLOOD PRESSURE: 80 MMHG | WEIGHT: 146 LBS | TEMPERATURE: 97.7 F | BODY MASS INDEX: 23.46 KG/M2 | OXYGEN SATURATION: 96 % | SYSTOLIC BLOOD PRESSURE: 158 MMHG | HEIGHT: 66 IN | RESPIRATION RATE: 14 BRPM | HEART RATE: 66 BPM

## 2019-02-20 DIAGNOSIS — M25.511 ACUTE PAIN OF RIGHT SHOULDER: ICD-10-CM

## 2019-02-20 PROCEDURE — 99213 OFFICE O/P EST LOW 20 MIN: CPT | Performed by: NURSE PRACTITIONER

## 2019-02-20 ASSESSMENT — PAIN SCALES - GENERAL: PAINLEVEL: 6=MODERATE PAIN

## 2019-02-20 NOTE — PROGRESS NOTES
Chief Complaint   Patient presents with   • Fall     02/15/2019; R Shoulder        HISTORY OF THE PRESENT ILLNESS: This is a 78 y.o. male established patient with the clinic, normally seen by Dr. Dee, who presents today for evaluation of right shoulder pain onset 2/15/19 after experiencing a fall.    Right shoulder pain after fall:  Patient reports that last Friday he slipped on ice, landing on his right shoulder. He has been experiencing pain along the joint since that time, however, it is improving, managed well with Advil and Vitamin B12/50mg Diclofenac at home. He reports that the B12/Diclofenac is working better than the Advil, taking these every 4 hours as needed. Initially, with the fall, patient denies any clicking or sharp shooting pain, and he is only experiencing a mild amount of pain with movement. Patient is still able to range the joint fully and reports no lasting swelling. Patient has chronic pain in the right shoulder, however, reports an exacerbation with the fall.    Past Medical History:   Diagnosis Date   • Diabetes (HCC)    • Hyperlipidemia    • Hypertension        Past Surgical History:   Procedure Laterality Date   • CATARACT EXTRACTION WITH IOL         Family Status   Relation Status   • Sis    • Mo    • Bro Alive   • Bro    • Sis    • Sis    • Sis    • Neg Hx (Not Specified)     Family History   Problem Relation Age of Onset   • Cancer Sister         stomach cancer   • Alcohol/Drug Brother    • Diabetes Sister    • Heart Disease Neg Hx        Social History   Substance Use Topics   • Smoking status: Former Smoker     Packs/day: 2.00     Years: 7.00     Quit date: 1993   • Smokeless tobacco: Never Used   • Alcohol use No       Allergies: Patient has no known allergies.    Current Outpatient Prescriptions Ordered in [facility]   Medication Sig Dispense Refill   • benazepril (LOTENSIN) 20 MG Tab Take 1 Tab by mouth every day. 90 Tab 3   • glipiZIDE  "(GLUCOTROL) 5 MG Tab TAKE 1 TABLET BY MOUTH TWICE DAILY 180 Tab 1   • metFORMIN (GLUCOPHAGE) 500 MG Tab Take 1 Tab by mouth 3 times a day, with meals. 270 Tab 1   • glucose blood strip 1 Strip by Other route 2 Times a Day. 180 Strip 3   • tamsulosin (FLOMAX) 0.4 MG capsule TAKE 1 CAPSULE BY MOUTH EVERY DAY 90 Cap 3   • acetaminophen (TYLENOL) 500 MG Tab Take 500-1,000 mg by mouth every 6 hours as needed.       No current Epic-ordered facility-administered medications on file.        Review of Systems   Constitutional:  Negative for fever, chills, weight loss and malaise/fatigue.   HENT:  Negative for ear pain, nosebleeds, congestion, sore throat and neck pain.    Eyes:  Negative for blurred vision.   Respiratory:  Negative for cough, sputum production, shortness of breath and wheezing.    Cardiovascular:  Negative for chest pain, palpitations, orthopnea and leg swelling.   Gastrointestinal:  Negative for heartburn, nausea, vomiting and abdominal pain.   Genitourinary:  Negative for dysuria, urgency and frequency.   Musculoskeletal: positive right shoulder pain Negative for myalgias, back pain  Skin:  Negative for rash and itching.   Neurological:  Negative for dizziness, tingling, tremors, sensory change, focal weakness and headaches.   Endo/Heme/Allergies:  Does not bruise/bleed easily.   Psychiatric/Behavioral:  Negative for depression, anxiety, or memory loss.     All other systems reviewed and are negative except as in HPI.    Exam: Blood pressure 158/80, pulse 66, temperature 36.5 °C (97.7 °F), temperature source Temporal, resp. rate 14, height 1.664 m (5' 5.51\"), weight 66.2 kg (146 lb), SpO2 96 %.  General:  Normal appearing. No distress.  Pulmonary:  Clear to ausculation.  Normal effort. No rales, ronchi, or wheezing.  Cardiovascular:  Regular rate and rhythm without murmur. Carotid and radial pulses are intact and equal bilaterally.  Neurologic:  Grossly nonfocal  Skin:  Warm and dry.  No obvious " lesions.  Musculoskeletal:  Normal gait. No extremity cyanosis, clubbing, or edema. Right shoulder Neck exam: No spinal tenderness to palpation. Normal flexion, extension and lateral rotation ROM.  Shoulder/arm exam: No deformity, erythema, edema or ecchymosis. Tenderness to palpation none. ROM intact.  5/5 strength bilaterally.   Elbow/forearm: Tenderness to palpation: none. Full ROM.  5/5 strength throughout bilaterally. 2+ DTR biceps and triceps bilaterally.   Psych:  Normal mood and affect. Alert and oriented x3. Judgment and insight is normal.    PLAN:  1. Right shoulder pain after fall:  I discussed with the patient via  that because the pain is improving, we can just continue to watch it to see if it continues to get better, without completing imaging at this time. I explained that if the symptoms do not continue to improve, but worsen, he should contact us and be evaluated further. Patient will also contact me, should he want the higher dose prescription Diclofenac. Discussed that he should limit diclofenac to every 8 hours.  Counseled patient regarding risks, benefits, side effects of diclofenac, discussed risk of overuse of medication.  Patient understands and agrees with treatment plan.    Follow-up as needed. Patient is encouraged to be seen in the emergency room for chest pain, palpitations, shortness of breath, dizziness, severe abdominal pain or other concerning symptoms.    Please note that this dictation was created using voice recognition software. I have made every reasonable attempt to correct obvious errors, but I expect that there are errors of grammar and possibly content that I did not discover before finalizing the note.      Assessment/Plan  1. Acute pain of right shoulder          IYashiar (Teodora), am scribing for, and in the presence of, PASCUAL Dumont    Electronically signed by: Yashira Ward (Teodora), 2/20/2019    IElias,  PASCUAL personally performed the services described in this documentation, as scribed by Yashira Ward in my presence, and it is both accurate and complete.

## 2019-05-01 ENCOUNTER — TELEPHONE (OUTPATIENT)
Dept: MEDICAL GROUP | Facility: PHYSICIAN GROUP | Age: 79
End: 2019-05-01

## 2019-05-01 NOTE — TELEPHONE ENCOUNTER
ESTABLISHED PATIENT PRE-VISIT PLANNING     Patient was NOT contacted to complete PVP.     Note: Patient will not be contacted if there is no indication to call.     1.  Reviewed notes from the last few office visits within the medical group: Yes    2.  If any orders were placed at last visit or intended to be done for this visit (i.e. 6 mos follow-up), do we have Results/Consult Notes?        •  Labs - Labs were not ordered at last office visit.   Note: If patient appointment is for lab review and patient did not complete labs, check with provider if OK to reschedule patient until labs completed.       •  Imaging - Imaging was not ordered at last office visit.       •  Referrals - No referrals were ordered at last office visit.    3. Is this appointment scheduled as a Hospital Follow-Up? No    4.  Immunizations were updated in Epic using WebIZ?: Epic matches WebIZ       •  Web Iz Recommendations: PREVNAR (PCV13) , TDAP, VARICELLA (Chicken Pox)  and SHINGRIX (Shingles)    5.  Patient is due for the following Health Maintenance Topics:   Health Maintenance Due   Topic Date Due   • DIABETES MONOFILAMENT / LE EXAM  10/13/2018   • RETINAL SCREENING  12/12/2018   • A1C SCREENING  04/01/2019       - Patient has completed FLU Immunization(s) per WebIZ. Chart has been updated.    6. Orders for overdue Health Maintenance topics pended in Pre-Charting? NO    7.  AHA (MDX) form printed for Provider? No, already completed    8.  Patient was NOT informed to arrive 15 min prior to their scheduled appointment and bring in their medication bottles.

## 2019-05-02 ENCOUNTER — HOSPITAL ENCOUNTER (OUTPATIENT)
Dept: RADIOLOGY | Facility: MEDICAL CENTER | Age: 79
End: 2019-05-02
Attending: NURSE PRACTITIONER
Payer: MEDICARE

## 2019-05-02 ENCOUNTER — OFFICE VISIT (OUTPATIENT)
Dept: MEDICAL GROUP | Facility: PHYSICIAN GROUP | Age: 79
End: 2019-05-02
Payer: MEDICARE

## 2019-05-02 ENCOUNTER — TELEPHONE (OUTPATIENT)
Dept: MEDICAL GROUP | Facility: PHYSICIAN GROUP | Age: 79
End: 2019-05-02

## 2019-05-02 VITALS
DIASTOLIC BLOOD PRESSURE: 76 MMHG | WEIGHT: 145 LBS | SYSTOLIC BLOOD PRESSURE: 132 MMHG | HEART RATE: 70 BPM | BODY MASS INDEX: 23.3 KG/M2 | OXYGEN SATURATION: 100 % | HEIGHT: 66 IN | RESPIRATION RATE: 14 BRPM | TEMPERATURE: 97.7 F

## 2019-05-02 DIAGNOSIS — E11.9 TYPE 2 DIABETES MELLITUS WITHOUT COMPLICATION, WITHOUT LONG-TERM CURRENT USE OF INSULIN (HCC): ICD-10-CM

## 2019-05-02 DIAGNOSIS — I10 ESSENTIAL HYPERTENSION: ICD-10-CM

## 2019-05-02 DIAGNOSIS — G89.29 CHRONIC RIGHT SHOULDER PAIN: ICD-10-CM

## 2019-05-02 DIAGNOSIS — M25.511 CHRONIC RIGHT SHOULDER PAIN: ICD-10-CM

## 2019-05-02 PROCEDURE — 73030 X-RAY EXAM OF SHOULDER: CPT | Mod: RT

## 2019-05-02 PROCEDURE — 99214 OFFICE O/P EST MOD 30 MIN: CPT | Performed by: NURSE PRACTITIONER

## 2019-05-02 NOTE — TELEPHONE ENCOUNTER
Phone Number Called: [phone] (home)     Message: LVM to call back.     Left Message for patient to call back: yes

## 2019-05-02 NOTE — PROGRESS NOTES
Chief Complaint   Patient presents with   • Shoulder Pain     R; x 1 year; maybe injury;        HISTORY OF THE PRESENT ILLNESS: This is a 78 y.o. male new patient to me. He is a former patient of Dr. Dee. This pleasant patient is here today to establish care and to discuss right shoulder pain.    Chronic right shoulder pain  Patient has had chronic constant right shoulder pain onset 1 year ago when he was moving shopping carts. States the pain has worsened with time, but it is currently not too severe. He motions the pain to the anterior and posterior portions of the shoulder. The pain is exacerbated with certain movements. He has been taking over-the-counter pain medication for the pain but does not recall the name of the medication. States this medication comes in a packet of 4 pills in which he takes all pills at once. He takes this medication about every couple of weeks. Denies any decreased  strength, weakness, numbness, or tingling. He denies having imaging done for this in the past.    Type 2 diabetes mellitus without complication, without long-term current use of insulin (HCC)  Chronic. Patient takes glipizide 5 mg one tab BID and metformin 500 mg one tab TID. No reports of medication side effects or associated symptoms regarding diabetes. No reports of chest pain, palpitations, shortness of breath, dizziness, headaches, or vision changes.       Past Medical History:   Diagnosis Date   • Diabetes (HCC)    • Hyperlipidemia    • Hypertension        Past Surgical History:   Procedure Laterality Date   • CATARACT EXTRACTION WITH IOL         Family Status   Relation Status   • Sis    • Mo    • Bro Alive   • Bro    • Sis    • Sis    • Sis    • Neg Hx (Not Specified)     Family History   Problem Relation Age of Onset   • Cancer Sister         stomach cancer   • Alcohol/Drug Brother    • Diabetes Sister    • Heart Disease Neg Hx        Social History   Substance  "Use Topics   • Smoking status: Former Smoker     Packs/day: 2.00     Years: 7.00   • Smokeless tobacco: Never Used      Comment: 65 years ago   • Alcohol use No       Allergies: Patient has no known allergies.    Current Outpatient Prescriptions Ordered in [facility]   Medication Sig Dispense Refill   • benazepril (LOTENSIN) 20 MG Tab Take 1 Tab by mouth every day. 90 Tab 3   • glipiZIDE (GLUCOTROL) 5 MG Tab TAKE 1 TABLET BY MOUTH TWICE DAILY 180 Tab 1   • metFORMIN (GLUCOPHAGE) 500 MG Tab Take 1 Tab by mouth 3 times a day, with meals. 270 Tab 1   • glucose blood strip 1 Strip by Other route 2 Times a Day. 180 Strip 3   • tamsulosin (FLOMAX) 0.4 MG capsule TAKE 1 CAPSULE BY MOUTH EVERY DAY 90 Cap 3   • acetaminophen (TYLENOL) 500 MG Tab Take 500-1,000 mg by mouth every 6 hours as needed.       No current Epic-ordered facility-administered medications on file.        Review of Systems   Constitutional: Negative for fever, chills, weight loss and malaise/fatigue.   Eyes: Negative for blurred vision.   Respiratory: Negative for cough, sputum production, shortness of breath and wheezing.    Cardiovascular: Negative for chest pain, palpitations, orthopnea and leg swelling.   Musculoskeletal: Right shoulder pain. Negative for back pain.  Neurological: Negative for dizziness, numbness, tingling, tremors, sensory change, focal weakness and headaches.   All other systems reviewed and are negative except as in HPI.    Exam: /76   Pulse 70   Temp 36.5 °C (97.7 °F) (Temporal)   Resp 14   Ht 1.664 m (5' 5.51\")   Wt 65.8 kg (145 lb)   SpO2 100%   General:  Normal appearing. No distress.  Pulmonary:  Clear to ausculation.  Normal effort. No rales, ronchi, or wheezing.  Cardiovascular:  Regular rate and rhythm without murmur. Carotid and radial pulses are intact and equal bilaterally.  Neurologic:  Grossly nonfocal  Skin:  Warm and dry.  No obvious lesions.  Musculoskeletal:  Normal gait. No extremity cyanosis, clubbing, or " edema.  Right shoulder/arm exam: No deformity, erythema, edema or ecchymosis. Tenderness to palpation anterior and posterior shoulder. ROM intact with pain. Empty can test positive. Drop arm test negative. Sulcus sign negative. 5/5 strength bilaterally.  Right elbow/forearm: Tenderness to palpation:. Full ROM intact.  5/5 strength throughout bilaterally  Psych:  Normal mood and affect. Alert and oriented x3. Judgment and insight is normal.    PLAN:    1. Chronic right shoulder pain  - Patient reports ongoing chronic right shoulder pain. States he has not had imaging tests done for this yet. Ordering xray evaluation  - Supportive care instructions and return precautions given.   - DX-SHOULDER 2+ RIGHT; Future    2. Type 2 diabetes mellitus without complication, without long-term current use of insulin (HCC)  - Continue current plan of care and medications. Ordering updated labs  - Counseled patient on diet and exercise. Advised decreasing intake of carbohydrates and sugary foods and increasing intake of vegetables.   - CBC WITH DIFFERENTIAL; Future  - Comp Metabolic Panel; Future  - HEMOGLOBIN A1C; Future  - Lipid Profile; Future       3. Essential hypertension  Continue current medications.    Follow-up in 3 months for diabetes/hypertension.    Patient is encouraged to be seen in the emergency room for chest pain, palpitations, shortness of breath, dizziness, severe abdominal pain or other concerning symptoms.     Please note that this dictation was created using voice recognition software. I have made every reasonable attempt to correct obvious errors, but I expect that there are errors of grammar and possibly content that I did not discover before finalizing the note.      Assessment/Plan  1. Chronic right shoulder pain  DX-SHOULDER 2+ RIGHT   2. Type 2 diabetes mellitus without complication, without long-term current use of insulin (HCC)  CBC WITH DIFFERENTIAL    Comp Metabolic Panel    HEMOGLOBIN A1C    Lipid  Profile         I have placed the below orders and discussed them with an approved delegating provider. The MA is performing the below orders under the direction of Dr. Cummins.       I, Seth Lindo (Scribe), am scribing for, and in the presence of, PASCUAL Dumont    Electronically signed by: Seth Lindo (Jerrellibe), 5/2/2019    IElias APRN personally performed the services described in this documentation, as scribed by Seth Lindo in my presence, and it is both accurate and complete.

## 2019-05-02 NOTE — TELEPHONE ENCOUNTER
----- Message from BRIAN Navarro sent at 5/2/2019 11:52 AM PDT -----  Please call pt and give lab results: Shoulder x-ray indicates arthrosis.  As well as soft tissue calcifications related to possible tendinitis or bursitis.

## 2019-05-03 NOTE — TELEPHONE ENCOUNTER
Phone Number Called: [phone] (home)      Message: LVM to call back.      Left Message for patient to call back: yes    2nd attempt

## 2019-05-06 NOTE — TELEPHONE ENCOUNTER
Phone Number Called: [phone] (home)      Message: LVM to call back.      Left Message for patient to call back: yes     3rd attempt       Letter sent

## 2019-05-08 DIAGNOSIS — E11.9 TYPE 2 DIABETES MELLITUS WITHOUT COMPLICATION, WITHOUT LONG-TERM CURRENT USE OF INSULIN (HCC): ICD-10-CM

## 2019-05-14 ENCOUNTER — TELEPHONE (OUTPATIENT)
Dept: MEDICAL GROUP | Facility: PHYSICIAN GROUP | Age: 79
End: 2019-05-14

## 2019-05-14 NOTE — TELEPHONE ENCOUNTER
ESTABLISHED PATIENT PRE-VISIT PLANNING     Patient was NOT contacted to complete PVP.     Note: Patient will not be contacted if there is no indication to call.     1.  Reviewed notes from the last few office visits within the medical group: Yes    2.  If any orders were placed at last visit or intended to be done for this visit (i.e. 6 mos follow-up), do we have Results/Consult Notes?        •  Labs - Labs ordered, NOT completed. Patient advised to complete prior to next appointment.   Note: If patient appointment is for lab review and patient did not complete labs, check with provider if OK to reschedule patient until labs completed.       •  Imaging - Imaging ordered, completed and results are in chart.       •  Referrals - No referrals were ordered at last office visit.    3. Is this appointment scheduled as a Hospital Follow-Up? No    4.  Immunizations were updated in Epic using WebIZ?: Epic matches WebIZ       •  Web Iz Recommendations: PREVNAR (PCV13) , TDAP, VARICELLA (Chicken Pox)  and SHINGRIX (Shingles)    5.  Patient is due for the following Health Maintenance Topics:   Health Maintenance Due   Topic Date Due   • DIABETES MONOFILAMENT / LE EXAM  10/13/2018   • RETINAL SCREENING  12/12/2018   • A1C SCREENING  04/01/2019       - Patient has completed FLU Immunization(s) per WebIZ. Chart has been updated.    6. Orders for overdue Health Maintenance topics pended in Pre-Charting? NO    7.  AHA (MDX) form printed for Provider? No, already completed    8.  Patient was NOT informed to arrive 15 min prior to their scheduled appointment and bring in their medication bottles.

## 2019-05-15 ENCOUNTER — OFFICE VISIT (OUTPATIENT)
Dept: MEDICAL GROUP | Facility: PHYSICIAN GROUP | Age: 79
End: 2019-05-15
Payer: MEDICARE

## 2019-05-15 VITALS
HEIGHT: 66 IN | BODY MASS INDEX: 23.3 KG/M2 | WEIGHT: 145 LBS | RESPIRATION RATE: 16 BRPM | SYSTOLIC BLOOD PRESSURE: 140 MMHG | OXYGEN SATURATION: 100 % | TEMPERATURE: 97.5 F | DIASTOLIC BLOOD PRESSURE: 68 MMHG | HEART RATE: 58 BPM

## 2019-05-15 DIAGNOSIS — M25.511 CHRONIC RIGHT SHOULDER PAIN: ICD-10-CM

## 2019-05-15 DIAGNOSIS — G89.29 CHRONIC RIGHT SHOULDER PAIN: ICD-10-CM

## 2019-05-15 PROCEDURE — 99214 OFFICE O/P EST MOD 30 MIN: CPT | Performed by: NURSE PRACTITIONER

## 2019-05-15 RX ORDER — MELOXICAM 7.5 MG/1
7.5 TABLET ORAL DAILY
Qty: 30 TAB | Refills: 0 | Status: SHIPPED | OUTPATIENT
Start: 2019-05-15 | End: 2019-06-20

## 2019-05-15 NOTE — PROGRESS NOTES
"Chief Complaint   Patient presents with   • Results     Xray        HISTORY OF THE PRESENT ILLNESS: This is a 78 y.o. male established patient who presents today for follow up on lab and xray results.    Chronic right shoulder pain  This is a chronic problem. At last visit, patient reported chronic constant right shoulder pain that started about 1 year ago while moving shopping carts. The pain has been progressively worsening since then. He had a shoulder xray ordered which showed impression: \"Acromioclavicular joint arthrosis. Soft tissue calcifications could indicate calcific tendinitis or bursitis.\" There was no evidence for fracture or dislocation. Patient does not report any new associated symptoms or concerns at this time. No decreased  strength, numbness, weakness, or tingling.    Patient would also like to go over lab results. Labs from 10/1/2018 showed glucose of 149. A1C is down from 7.4 to 7.0. Triglycerides were down from 216 to 177. PSA was 6.5. Patient states he did see urology who has cleared him.    Past Medical History:   Diagnosis Date   • Diabetes (HCC)    • Hyperlipidemia    • Hypertension        Past Surgical History:   Procedure Laterality Date   • CATARACT EXTRACTION WITH IOL         Family Status   Relation Status   • Sis    • Mo    • Bro Alive   • Bro    • Sis    • Sis    • Sis    • Neg Hx (Not Specified)     Family History   Problem Relation Age of Onset   • Cancer Sister         stomach cancer   • Alcohol/Drug Brother    • Diabetes Sister    • Heart Disease Neg Hx        Social History   Substance Use Topics   • Smoking status: Former Smoker     Packs/day: 2.00     Years: 7.00   • Smokeless tobacco: Never Used      Comment: 65 years ago   • Alcohol use No       Allergies: Patient has no known allergies.    Current Outpatient Prescriptions Ordered in [facility]   Medication Sig Dispense Refill   • meloxicam (MOBIC) 7.5 MG Tab Take 1 Tab by mouth " "every day. 30 Tab 0   • metFORMIN (GLUCOPHAGE) 500 MG Tab Take 1 Tab by mouth 3 times a day, with meals. 270 Tab 1   • benazepril (LOTENSIN) 20 MG Tab Take 1 Tab by mouth every day. 90 Tab 3   • glipiZIDE (GLUCOTROL) 5 MG Tab TAKE 1 TABLET BY MOUTH TWICE DAILY 180 Tab 1   • glucose blood strip 1 Strip by Other route 2 Times a Day. 180 Strip 3   • tamsulosin (FLOMAX) 0.4 MG capsule TAKE 1 CAPSULE BY MOUTH EVERY DAY 90 Cap 3   • acetaminophen (TYLENOL) 500 MG Tab Take 500-1,000 mg by mouth every 6 hours as needed.       No current Epic-ordered facility-administered medications on file.        Review of Systems   Constitutional: Negative for fever, chills, weight loss and malaise/fatigue.   Respiratory: Negative for cough, sputum production, shortness of breath and wheezing.    Cardiovascular: Negative for chest pain, palpitations, orthopnea and leg swelling.   Musculoskeletal: Right shoulder pain.  Neurological: Negative for dizziness, decreased  strength, numbness, tingling, tremors, sensory change, focal weakness and headaches.     All other systems reviewed and are negative except as in HPI.    Exam: /68 (BP Location: Left arm, Patient Position: Sitting, BP Cuff Size: Adult)   Pulse (!) 58   Temp 36.4 °C (97.5 °F) (Temporal)   Resp 16   Ht 1.664 m (5' 5.51\")   Wt 65.8 kg (145 lb)   SpO2 100%   General:  Normal appearing. No distress.  Pulmonary:  Clear to ausculation.  Normal effort. No rales, ronchi, or wheezing.  Cardiovascular:  Regular rate and rhythm without murmur. Carotid and radial pulses are intact and equal bilaterally.  Neurologic:  Grossly nonfocal  Skin:  Warm and dry.  No obvious lesions.  Musculoskeletal:  Normal gait. No extremity cyanosis, clubbing, or edema.  Right shoulder/arm exam: No deformity, erythema, edema or ecchymosis. Tenderness to palpation anterior and posterior shoulder. ROM intact with pain. Empty can test positive. Drop arm test negative. Sulcus sign negative. 5/5 " "strength bilaterally.  Right elbow/forearm: Tenderness to palpation:. Full ROM intact.  5/5 strength throughout bilaterally  Psych:  Normal mood and affect. Alert and oriented x3. Judgment and insight is normal.    PLAN:    1. Chronic right shoulder pain  - Discussed xray results which showed impression, \"Acromioclavicular joint arthrosis. Soft tissue calcifications could indicate calcific tendinitis or bursitis.\" No evidence for fracture or dislocation  - Advised conservative measures. Also informed him of the option for physical therapy and injections. He declines at this time and would like to try medication first. Informed him that we can try anti-inflammatory medication. Informed him we will not prescribe narcotics as they do not provide long term relief and do not solve the underlying problem.  - Prescribing meloxicam. Reviewed the risks and benefits as well as potential side effects of meloxicam with patient including GI symptoms such as nausea and acid reflux. Advised patient to let us know if he experiences intolerable side effects. Informed him he can take from 7.5 mg up to 15 mg in a day.  - meloxicam (MOBIC) 7.5 MG Tab; Take 1 Tab by mouth every day.  Dispense: 30 Tab; Refill: 0     Discussed lab results from October 2018 which showed normal electrolytes. Glucose was mildly elevated at 149 which is the same as prior labs. A1C was down from 7.4 to 7.0. Triglycerides were down from 216 to 177. PSA is still elevated at 6.5. Patient states he has followed with urology. He was cleared from urology and no longer sees them.    Patient will follow up in one month. He is encouraged to be seen in the emergency room for chest pain, palpitations, shortness of breath, dizziness, severe abdominal pain or other concerning symptoms.     Please note that this dictation was created using voice recognition software. I have made every reasonable attempt to correct obvious errors, but I expect that there are errors of " grammar and possibly content that I did not discover before finalizing the note.      Assessment/Plan  1. Chronic right shoulder pain  meloxicam (MOBIC) 7.5 MG Tab         I have placed the below orders and discussed them with an approved delegating provider. The MA is performing the below orders under the direction of Dr. Cummins.       Seth VILLARREAL (Scribe), am scribing for, and in the presence of, PASCUAL Dumont    Electronically signed by: Seth Lindo (Jerrellibe), 5/15/2019    Elias VILLARREAL APRN personally performed the services described in this documentation, as scribed by Seth Lindo in my presence, and it is both accurate and complete.

## 2019-06-10 RX ORDER — GLIPIZIDE 5 MG/1
TABLET ORAL
Qty: 100 TAB | Refills: 0 | Status: SHIPPED | OUTPATIENT
Start: 2019-06-10 | End: 2019-06-20 | Stop reason: SDUPTHER

## 2019-06-10 NOTE — TELEPHONE ENCOUNTER
Was the patient seen in the last year in this department? Yes    Does patient have an active prescription for medications requested? No     Received Request Via: Pharmacy     [facility] WANTS 100 DAY SUPPLY. VERONICA

## 2019-06-19 ENCOUNTER — TELEPHONE (OUTPATIENT)
Dept: MEDICAL GROUP | Facility: PHYSICIAN GROUP | Age: 79
End: 2019-06-19

## 2019-06-19 NOTE — TELEPHONE ENCOUNTER
ESTABLISHED PATIENT PRE-VISIT PLANNING     Patient was NOT contacted to complete PVP.     Note: Patient will not be contacted if there is no indication to call.     1.  Reviewed notes from the last few office visits within the medical group: Yes    2.  If any orders were placed at last visit or intended to be done for this visit (i.e. 6 mos follow-up), do we have Results/Consult Notes?        •  Labs - Labs ordered, NOT completed. Patient advised to complete prior to next appointment.   Note: If patient appointment is for lab review and patient did not complete labs, check with provider if OK to reschedule patient until labs completed.       •  Imaging - Imaging ordered, completed and results are in chart.       •  Referrals - No referrals were ordered at last office visit.    3. Is this appointment scheduled as a Hospital Follow-Up? No    4.  Immunizations were updated in Epic using WebIZ?: Epic matches WebIZ       •  Web Iz Recommendations: TDAP, VARICELLA (Chicken Pox)  and SHINGRIX (Shingles)    5.  Patient is due for the following Health Maintenance Topics:   Health Maintenance Due   Topic Date Due   • DIABETES MONOFILAMENT / LE EXAM  10/13/2018   • RETINAL SCREENING  12/12/2018   • A1C SCREENING  04/01/2019       - Patient has completed FLU, PNEUMOVAX (PPSV23), PREVNAR (PCV13)  and SHINGRIX (Shingles) Immunization(s) per WebIZ. Chart has been updated.    6. Orders for overdue Health Maintenance topics pended in Pre-Charting? NO    7.  AHA (MDX) form printed for Provider? No, already completed    8.  Patient was NOT informed to arrive 15 min prior to their scheduled appointment and bring in their medication bottles.

## 2019-06-19 NOTE — TELEPHONE ENCOUNTER
ESTABLISHED PATIENT PRE-VISIT PLANNING     Patient was NOT contacted to complete PVP.     Note: Patient will not be contacted if there is no indication to call.     1.  Reviewed notes from the last few office visits within the medical group: Yes    2.  If any orders were placed at last visit or intended to be done for this visit (i.e. 6 mos follow-up), do we have Results/Consult Notes?        •  Labs - Labs ordered, NOT completed. Patient advised to complete prior to next appointment.   Note: If patient appointment is for lab review and patient did not complete labs, check with provider if OK to reschedule patient until labs completed.       •  Imaging - Imaging was not ordered at last office visit.       •  Referrals - No referrals were ordered at last office visit.    3. Is this appointment scheduled as a Hospital Follow-Up? No    4.  Immunizations were updated in Epic using WebIZ?: Epic matches WebIZ       •  Web Iz Recommendations: PREVNAR (PCV13) , TDAP, VARICELLA (Chicken Pox)  and SHINGRIX (Shingles)    5.  Patient is due for the following Health Maintenance Topics:   Health Maintenance Due   Topic Date Due   • DIABETES MONOFILAMENT / LE EXAM  10/13/2018   • RETINAL SCREENING  12/12/2018   • A1C SCREENING  04/01/2019       - Patient has completed FLU Immunization(s) per WebIZ. Chart has been updated.    6. Orders for overdue Health Maintenance topics pended in Pre-Charting? NO    7.  AHA (MDX) form printed for Provider? No, already completed    8.  Patient was NOT informed to arrive 15 min prior to their scheduled appointment and bring in their medication bottles.

## 2019-06-20 ENCOUNTER — OFFICE VISIT (OUTPATIENT)
Dept: MEDICAL GROUP | Facility: PHYSICIAN GROUP | Age: 79
End: 2019-06-20
Payer: MEDICARE

## 2019-06-20 VITALS
HEIGHT: 65 IN | DIASTOLIC BLOOD PRESSURE: 50 MMHG | WEIGHT: 144 LBS | TEMPERATURE: 98.2 F | BODY MASS INDEX: 23.99 KG/M2 | HEART RATE: 74 BPM | OXYGEN SATURATION: 97 % | SYSTOLIC BLOOD PRESSURE: 90 MMHG

## 2019-06-20 DIAGNOSIS — N40.1 BENIGN NON-NODULAR PROSTATIC HYPERPLASIA WITH LOWER URINARY TRACT SYMPTOMS: ICD-10-CM

## 2019-06-20 DIAGNOSIS — G89.29 CHRONIC RIGHT SHOULDER PAIN: ICD-10-CM

## 2019-06-20 DIAGNOSIS — E11.9 TYPE 2 DIABETES MELLITUS WITHOUT COMPLICATION, WITHOUT LONG-TERM CURRENT USE OF INSULIN (HCC): ICD-10-CM

## 2019-06-20 DIAGNOSIS — M25.511 CHRONIC RIGHT SHOULDER PAIN: ICD-10-CM

## 2019-06-20 DIAGNOSIS — I10 ESSENTIAL HYPERTENSION: ICD-10-CM

## 2019-06-20 DIAGNOSIS — E78.2 MIXED HYPERLIPIDEMIA: ICD-10-CM

## 2019-06-20 PROCEDURE — 99214 OFFICE O/P EST MOD 30 MIN: CPT | Performed by: NURSE PRACTITIONER

## 2019-06-20 RX ORDER — TAMSULOSIN HYDROCHLORIDE 0.4 MG/1
0.8 CAPSULE ORAL DAILY
Qty: 180 CAP | Refills: 3 | Status: SHIPPED | OUTPATIENT
Start: 2019-06-20 | End: 2019-07-26 | Stop reason: SDUPTHER

## 2019-06-20 RX ORDER — TRAMADOL HYDROCHLORIDE 50 MG/1
50 TABLET ORAL EVERY 12 HOURS PRN
Qty: 60 TAB | Refills: 0 | Status: SHIPPED | OUTPATIENT
Start: 2019-06-20 | End: 2019-09-05 | Stop reason: SDUPTHER

## 2019-06-20 RX ORDER — GLIPIZIDE 5 MG/1
TABLET ORAL
Qty: 100 TAB | Refills: 3 | Status: SHIPPED | OUTPATIENT
Start: 2019-06-20 | End: 2019-07-26 | Stop reason: SDUPTHER

## 2019-06-20 RX ORDER — BENAZEPRIL HYDROCHLORIDE 5 MG/1
5 TABLET ORAL DAILY
Qty: 30 TAB | Refills: 0 | Status: SHIPPED | OUTPATIENT
Start: 2019-06-20 | End: 2019-08-08 | Stop reason: SDUPTHER

## 2019-06-20 NOTE — PROGRESS NOTES
"Chief Complaint   Patient presents with   • Shoulder Pain     Pt sts he is here for pain on his right shoulder. Pt reports localized constant right shoulder pain       HISTORY OF THE PRESENT ILLNESS: This is a 78 y.o. male established patient who presents today for follow up on right shoulder pain and to discuss his medical problems.    Chronic right shoulder pain  This is a chronic problem. At last visit, patient reported chronic constant right shoulder pain that started about 1 year ago while moving shopping carts. Shoulder xray from 5/2 showed impression: \"Acromioclavicular joint arthrosis. Soft tissue calcifications could indicate calcific tendinitis or bursitis.\" At last visit, patient was prescribed meloxicam. He has been compliant with taking this medication but reports still having strong pains to the shoulder. He is requesting to get a stronger medication to help alleviate the pain better. He notes that he is able to use the shoulder somewhat and would like to be referred to physical therapy. No reports of any weakness.    Essential hypertension  Chronic. Patient's blood pressure today was noted to be 90/50. He denies any dizziness or fatigue. He has been compliant with taking his benazepril 10 mg once daily without reports of any medication side effects. States he does not monitor his blood pressure at home.    Mixed hyperlipidemia  Chronic and stable at this time. Patient is not currently on any cholesterol medications. No reports of any active associated symptoms regarding hyperlipidemia. Will further discuss at future visit.    Type 2 diabetes mellitus without complication, without long-term current use of insulin (HCC)  Chronic. Patient is currently on glipizide and metformin. No reports of medication side effects or associated symptoms regarding diabetes.     Benign non-nodular prostatic hyperplasia with lower urinary tract symptoms  Chronic. Patient was initially on tamsulosin 0.4 mg once daily. He " read somewhere that he could take two if his symptoms were bad. States he was waking up 2-3 times at night to go to the bathroom while taking one tamsulosin. He has been taking two tamsulosin lately and now only gets up once per night to use the bathroom. He is requesting to change his prescription to two tamsulosin daily.    Past Medical History:   Diagnosis Date   • Diabetes (HCC)    • Hyperlipidemia    • Hypertension        Past Surgical History:   Procedure Laterality Date   • CATARACT EXTRACTION WITH IOL         Family Status   Relation Status   • Sis    • Mo    • Bro Alive   • Bro    • Sis    • Sis    • Sis    • Neg Hx (Not Specified)     Family History   Problem Relation Age of Onset   • Cancer Sister         stomach cancer   • Alcohol/Drug Brother    • Diabetes Sister    • Heart Disease Neg Hx        Social History   Substance Use Topics   • Smoking status: Former Smoker     Packs/day: 2.00     Years: 7.00     Quit date: 1959   • Smokeless tobacco: Never Used      Comment: 65 years ago   • Alcohol use No       Allergies: Patient has no known allergies.    Current Outpatient Prescriptions Ordered in [facility]   Medication Sig Dispense Refill   • tramadol (ULTRAM) 50 MG Tab Take 1 Tab by mouth every 12 hours as needed for Moderate Pain for up to 30 days. 60 Tab 0   • tamsulosin (FLOMAX) 0.4 MG capsule Take 2 Caps by mouth every day for 90 days. 180 Cap 3   • glipiZIDE (GLUCOTROL) 5 MG Tab TAKE 1 TABLET BY MOUTH TWICE DAILY 100 Tab 3   • metFORMIN (GLUCOPHAGE) 500 MG Tab Take 1 Tab by mouth 3 times a day, with meals. 270 Tab 3   • benazepril (LOTENSIN) 5 MG Tab Take 1 Tab by mouth every day. 30 Tab 0   • glucose blood strip 1 Strip by Other route 2 Times a Day. 180 Strip 3   • acetaminophen (TYLENOL) 500 MG Tab Take 500-1,000 mg by mouth every 6 hours as needed.       No current Epic-ordered facility-administered medications on file.        Review of Systems  "  Constitutional: Negative for fever, chills, weight loss and malaise/fatigue.   HENT: Negative for ear pain, nosebleeds, congestion, sore throat and neck pain.    Eyes: Negative for blurred vision.   Respiratory: Negative for cough, sputum production, shortness of breath and wheezing.    Cardiovascular: Negative for chest pain, palpitations, orthopnea and leg swelling.   Genitourinary: Negative for dysuria  Musculoskeletal: Right shoulder pain. Negative for back pain.  Skin: Negative for rash and itching.   Neurological: Negative for dizziness, tingling, tremors, sensory change, focal weakness and headaches.   Endo/Heme/Allergies: Does not bruise/bleed easily.    All other systems reviewed and are negative except as in HPI.    Exam: BP (!) 90/50 (BP Location: Left arm, Patient Position: Sitting, BP Cuff Size: Adult)   Pulse 74   Temp 36.8 °C (98.2 °F) (Temporal)   Ht 1.66 m (5' 5.35\")   Wt 65.3 kg (144 lb)   SpO2 97%   General:  Normal appearing. No distress.  Pulmonary:  Clear to ausculation.  Normal effort. No rales, ronchi, or wheezing.  Cardiovascular:  Regular rate and rhythm without murmur. Carotid and radial pulses are intact and equal bilaterally.  Neurologic:  Grossly nonfocal  Skin:  Warm and dry.  No obvious lesions.  Musculoskeletal:  Normal gait. No extremity cyanosis, clubbing, or edema. Some tenderness in right shoulder.  Psych:  Normal mood and affect. Alert and oriented x3. Judgment and insight is normal.    PLAN:    1. Chronic right shoulder pain  - Patient reports ongoing right shoulder pain. States meloxicam has not helped much. He would like referral to physical therapy. He requests for stronger medication for the pain. Prescribing tramadol, as noted below. Reviewed the risks and benefits as well as potential side effects of tramadol with patient. I reviewed prescription monitoring program for patient's narcotic use before prescribing a scheduled drug.The patient will not drink alcohol nor " drive with prescribed medications.  - REFERRAL TO PHYSICAL THERAPY Reason for Therapy: Eval/Treat/Report  - tramadol (ULTRAM) 50 MG Tab; Take 1 Tab by mouth every 12 hours as needed for Moderate Pain for up to 30 days.  Dispense: 60 Tab; Refill: 0    2. Essential hypertension  - Patient's blood pressure today was noted to be 90/50. He is asymptomatic with regards to his blood pressure. He agrees to decrease his dose of benazepril to 5 mg. Will continue to monitor.  - benazepril (LOTENSIN) 5 MG Tab; Take 1 Tab by mouth every day.  Dispense: 30 Tab; Refill: 0    3. Mixed hyperlipidemia  - Stable at this time. Will discuss at future visit.    4. Type 2 diabetes mellitus without complication, without long-term current use of insulin (HCC)  - Stable at this time. Refilling medications as noted below. Continue current plan of care and medications.   - Counseled patient on diet and exercise. Advised decreasing intake of carbohydrates and sugary foods and increasing intake of vegetables.   - glipiZIDE (GLUCOTROL) 5 MG Tab; TAKE 1 TABLET BY MOUTH TWICE DAILY  Dispense: 100 Tab; Refill: 3  - metFORMIN (GLUCOPHAGE) 500 MG Tab; Take 1 Tab by mouth 3 times a day, with meals.  Dispense: 270 Tab; Refill: 3    5. Benign non-nodular prostatic hyperplasia with lower urinary tract symptoms  - Patient states he has been taking two of the tamsulosin 0.4 mg lately which has been helping with his prostate symptoms. States he now only wakes up once per night to use the bathroom on the two tamsulosin. Changing prescription to two tamsulosin 0.4 mg per day, as noted below.  - tamsulosin (FLOMAX) 0.4 MG capsule; Take 2 Caps by mouth every day for 90 days.  Dispense: 180 Cap; Refill: 3     Patient will follow up in one month. He is encouraged to be seen in the emergency room for chest pain, palpitations, shortness of breath, dizziness, severe abdominal pain or other concerning symptoms.     Please note that this dictation was created using  voice recognition software. I have made every reasonable attempt to correct obvious errors, but I expect that there are errors of grammar and possibly content that I did not discover before finalizing the note.      Assessment/Plan  1. Chronic right shoulder pain  REFERRAL TO PHYSICAL THERAPY Reason for Therapy: Eval/Treat/Report    tramadol (ULTRAM) 50 MG Tab   2. Essential hypertension  benazepril (LOTENSIN) 5 MG Tab   3. Mixed hyperlipidemia     4. Type 2 diabetes mellitus without complication, without long-term current use of insulin (HCC)  glipiZIDE (GLUCOTROL) 5 MG Tab    metFORMIN (GLUCOPHAGE) 500 MG Tab   5. Benign non-nodular prostatic hyperplasia with lower urinary tract symptoms  tamsulosin (FLOMAX) 0.4 MG capsule         I have placed the below orders and discussed them with an approved delegating provider. The MA is performing the below orders under the direction of Dr. Cummins.       Seth VILLARREAL (Scribe), am scribing for, and in the presence of, PASCUAL Dumont    Electronically signed by: Seth Lindo (Teodora), 6/20/2019    Elias VILLARREAL APRN personally performed the services described in this documentation, as scribed by Seth Lindo in my presence, and it is both accurate and complete.

## 2019-06-24 ENCOUNTER — TELEPHONE (OUTPATIENT)
Dept: MEDICAL GROUP | Facility: PHYSICIAN GROUP | Age: 79
End: 2019-06-24

## 2019-06-24 RX ORDER — LANCETS 30 GAUGE
EACH MISCELLANEOUS
Qty: 100 EACH | Refills: 3 | Status: SHIPPED | OUTPATIENT
Start: 2019-06-24

## 2019-06-24 NOTE — TELEPHONE ENCOUNTER
Phone Number Called: [phone] (home)       Call outcome: left message for patient to call back regarding message below    Message: lancets sent. LM

## 2019-06-24 NOTE — TELEPHONE ENCOUNTER
Pt is here because he said he needs lancets. Pt states he does not have any left. Please call pt with any questions. He will check with pharmacy later today

## 2019-07-01 ENCOUNTER — PHYSICAL THERAPY (OUTPATIENT)
Dept: PHYSICAL THERAPY | Facility: REHABILITATION | Age: 79
End: 2019-07-01
Attending: NURSE PRACTITIONER
Payer: MEDICARE

## 2019-07-01 DIAGNOSIS — M25.511 CHRONIC RIGHT SHOULDER PAIN: ICD-10-CM

## 2019-07-01 DIAGNOSIS — G89.29 CHRONIC RIGHT SHOULDER PAIN: ICD-10-CM

## 2019-07-01 PROCEDURE — 97161 PT EVAL LOW COMPLEX 20 MIN: CPT

## 2019-07-01 ASSESSMENT — ENCOUNTER SYMPTOMS
ALLEVIATING FACTORS: PAIN MEDICATION
QUALITY: ACHING
PAIN TIMING: CONTINUOUSLY

## 2019-07-01 NOTE — OP THERAPY EVALUATION
Outpatient Physical Therapy  INITIAL EVALUATION    [facility] Physical Therapy [address].  [address]  [place] [address]  Phone:  [phone]  Fax:  [phone]    Date of Evaluation: 07/01/2019    Patient: Wilmar Zaragoza  YOB: 1940  MRN: [id number]     Referring Provider: [address]  [place] [address]   Referring Diagnosis Chronic right shoulder pain [M25.511, G89.29]     Time Calculation  Start time: 1530  Stop time: 1620 Time Calculation (min): 50 minutes     Physical Therapy Occurrence Codes    Date of onset of impairment:  7/1/17   Date physical therapy care plan established or reviewed:  7/1/19   Date physical therapy treatment started:  7/1/19          Chief Complaint: Shoulder Problem    Visit Diagnoses     ICD-10-CM   1. Chronic right shoulder pain M25.511    G89.29         Subjective:   History of Present Illness:     Date of onset:  7/1/2018    Mechanism of injury:  Patient reports right shoulder pain that began almost a year ago when he was doing some strength and trying to push a carts. Patient reports he was taking and he was taking some pills.  Patient reports his shoulder pain is achy and when he is lying down and tries to move his arm, he hears a crack.  Patient denies any headaches associated with it. Patient reports he cannot lie on his right shoulder. Patient denies burning, tingling or numbness.     PMH - patient reports prostate cancer of which he has had for 15 years and has had no treatment for it.   Sleep disturbance:  Interrupted sleep  Pain:     Quality:  Aching    Pain timing:  Continuously (patient reports nothing makes his pain better. )    Relieving factors:  Pain medication    Pain Comments::  Patient reports he is taking pain medication from his [place] the [place] and medicine for pain that he got in [facility].   Social Support:     Lives in:  One-story house  Hand dominance:  Right  Treatments:      None    Activities of Daily Living:     Patient reported ADL status: Patient reports he is currently retired. Patient reports he would like to be able to swim and do exercises.  Patient reports he is unable to place a heavy object that is heavy in a cabinet overhead.  He would like to be able to throw a ball and lift weights.  He had pain with back stroke.    Patient Goals:     Patient goals for therapy:  Return to sport/leisure activities      Past Medical History:   Diagnosis Date   • Diabetes (HCC)    • Hyperlipidemia    • Hypertension      Past Surgical History:   Procedure Laterality Date   • CATARACT EXTRACTION WITH IOL       Social History   Substance Use Topics   • Smoking status: Former Smoker     Packs/day: 2.00     Years: 7.00     Quit date: 6/20/1959   • Smokeless tobacco: Never Used      Comment: 65 years ago   • Alcohol use No     Family and Occupational History     Social History   • Marital status:      Spouse name: N/A   • Number of children: N/A   • Years of education: N/A       Objective     Neurological Testing     Reflexes   Left   Biceps (C5/C6): trace (1+)  Triceps (C7): trace (1+)    Right   Biceps (C5/C6): trace (1+)  Triceps (C7): trace (1+)    Active Range of Motion   Left Shoulder   Normal active range of motion    Right Shoulder   Flexion: WFL and with pain  Abduction: 95 degrees with pain  Internal rotation BTB: L5 with pain    Tests     Left Shoulder   Negative horn blower.     Right Shoulder   Positive full can and Neer's.         Therapeutic Exercises (CPT 82411):     1. Posture re education     2. Scap squeeze       Time-based treatments/modalities:          Assessment, Response and Plan:   Impairments: impaired functional mobility, impaired physical strength, lacks appropriate home exercise program and pain with function    Assessment details:  Patient is a 72 year old male with a history of right shoulder pain that is constant and progressively worsening who demonstrates  decreased strength and ROM. Patient would benefit from skilled PT with a focus on the deficits above to decrease pain and to improve quality of life.   Barriers to therapy:  Language  Prognosis: fair    Goals:   Short Term Goals:   1. Patient will be independent in HEP with written instructions.   Short term goal time span:  2-4 weeks      Long Term Goals:    1. Patient will score <15 on the QuickDash.   2. Patient will be able to swim at least 15 minutes without being limited by shoulder pain.   3. Patient will be able to sleep on his right side at least 50% of the time without being limited by shoulder pain.   Long term goal time span:  6-8 weeks    Plan:   Therapy options:  Physical therapy treatment to continue  Planned therapy interventions:  E Stim Unattended (CPT 85386), Neuromuscular Re-education (CPT 55456) and Therapeutic Exercise (CPT 64501)  Frequency:  1x week  Duration in weeks:  8  Discussed with:  Patient      Functional Limitations and Severity Modifiers  Quickdash General Total Score: 22.73   Current:     Goal:       Referring provider co-signature:  I have reviewed this plan of care and my co-signature certifies the need for services.  Certification Dates:   From 7/1/19    To 9/23/19    Physician Signature: ________________________________ Date: ______________

## 2019-07-03 ENCOUNTER — APPOINTMENT (OUTPATIENT)
Dept: PHYSICAL THERAPY | Facility: REHABILITATION | Age: 79
End: 2019-07-03
Payer: MEDICARE

## 2019-07-10 ENCOUNTER — PHYSICAL THERAPY (OUTPATIENT)
Dept: PHYSICAL THERAPY | Facility: REHABILITATION | Age: 79
End: 2019-07-10
Attending: NURSE PRACTITIONER
Payer: MEDICARE

## 2019-07-10 DIAGNOSIS — G89.29 CHRONIC RIGHT SHOULDER PAIN: ICD-10-CM

## 2019-07-10 DIAGNOSIS — M25.511 CHRONIC RIGHT SHOULDER PAIN: ICD-10-CM

## 2019-07-10 PROCEDURE — 97014 ELECTRIC STIMULATION THERAPY: CPT

## 2019-07-10 PROCEDURE — 97110 THERAPEUTIC EXERCISES: CPT

## 2019-07-10 NOTE — OP THERAPY DAILY TREATMENT
Outpatient Physical Therapy  DAILY TREATMENT     [facility] Physical Therapy [address].  [address]  [place] [address]  Phone:  [phone]  Fax:  [phone]    Date: 07/10/2019    Patient: Wilmar Zaragoza  YOB: 1940  MRN: [id number]     Time Calculation  Start time: 1600  Stop time: 1645 Time Calculation (min): 45 minutes     Chief Complaint: No chief complaint on file.    Visit #: 2    SUBJECTIVE:  I am doing okay    OBJECTIVE:    Therapeutic Exercises (CPT 03166):     1. UBE x 5 minutes     2. Rows     3. Lat pulls     4. Wall slides     5. [place]     10. UPOC due 8/23/19    Therapeutic Treatments and Modalities:     1. E Stim Unattended (CPT 81364), Russian stim to IS and SS with heat x 15min     Time-based treatments/modalities:  Therapeutic exercise minutes (CPT 39315): 25 minutes       ASSESSMENT:   Response to treatment: Patient demonstrates poor scapular stability and reports decreased pain when lower trap is active.     PLAN/RECOMMENDATIONS:   Plan for treatment: therapy treatment to continue next visit.  Planned interventions for next visit: continue with current treatment.

## 2019-07-11 ENCOUNTER — TELEPHONE (OUTPATIENT)
Dept: MEDICAL GROUP | Facility: PHYSICIAN GROUP | Age: 79
End: 2019-07-11

## 2019-07-11 NOTE — TELEPHONE ENCOUNTER
ESTABLISHED PATIENT PRE-VISIT PLANNING     Patient was NOT contacted to complete PVP.     Note: Patient will not be contacted if there is no indication to call.     1.  Reviewed notes from the last few office visits within the medical group: Yes    2.  If any orders were placed at last visit or intended to be done for this visit (i.e. 6 mos follow-up), do we have Results/Consult Notes?        •  Labs - Labs were not ordered at last office visit.   Note: If patient appointment is for lab review and patient did not complete labs, check with provider if OK to reschedule patient until labs completed.       •  Imaging - Imaging was not ordered at last office visit.       •  Referrals - Referral ordered, patient was seen and consult notes are in chart. Care Teams updated  NO.    3. Is this appointment scheduled as a Hospital Follow-Up? No    4.  Immunizations were updated in Epic using WebIZ?: Epic matches WebIZ       •  Web Iz Recommendations: PREVNAR (PCV13) , TDAP, VARICELLA (Chicken Pox)  and SHINGRIX (Shingles)    5.  Patient is due for the following Health Maintenance Topics:   Health Maintenance Due   Topic Date Due   • DIABETES MONOFILAMENT / LE EXAM  10/13/2018   • RETINAL SCREENING  12/12/2018   • A1C SCREENING  04/01/2019       - Patient has completed FLU Immunization(s) per WebIZ. Chart has been updated.    6. Orders for overdue Health Maintenance topics pended in Pre-Charting? NO    7.  AHA (MDX) form printed for Provider? No, already completed    8.  Patient was NOT informed to arrive 15 min prior to their scheduled appointment and bring in their medication bottles.

## 2019-07-15 ENCOUNTER — APPOINTMENT (OUTPATIENT)
Dept: PHYSICAL THERAPY | Facility: REHABILITATION | Age: 79
End: 2019-07-15
Payer: MEDICARE

## 2019-07-16 ENCOUNTER — PHYSICAL THERAPY (OUTPATIENT)
Dept: PHYSICAL THERAPY | Facility: REHABILITATION | Age: 79
End: 2019-07-16
Attending: NURSE PRACTITIONER
Payer: MEDICARE

## 2019-07-16 DIAGNOSIS — G89.29 CHRONIC RIGHT SHOULDER PAIN: ICD-10-CM

## 2019-07-16 DIAGNOSIS — M25.511 CHRONIC RIGHT SHOULDER PAIN: ICD-10-CM

## 2019-07-16 PROCEDURE — 97110 THERAPEUTIC EXERCISES: CPT

## 2019-07-16 PROCEDURE — 97014 ELECTRIC STIMULATION THERAPY: CPT

## 2019-07-16 NOTE — OP THERAPY DAILY TREATMENT
Outpatient Physical Therapy  DAILY TREATMENT     [facility] Physical [address].  [address]  [place] [address]  Phone:  [phone]  Fax:  [phone]    Date: 07/16/2019    Patient: Wilmar Zaragoza  YOB: 1940  MRN: [id number]     Time Calculation  Start time: 1600  Stop time: 1645 Time Calculation (min): 45 minutes     Chief Complaint: Shoulder Problem    Visit #: 3    SUBJECTIVE:  I am feeling good. I helped a friend paint today and I did not have any pain.     OBJECTIVE:    Therapeutic Exercises (CPT 06150):     1. UBE x 5 minutes     2. Rows     3. Lat pulls     4. Wall slides     5. [place]     6. Sidelying abduction     7. Standing GH flexion with level 0 band     8. Wall push up with press     9. Wall slide with lift off     10. UPOC due 8/23/19    Therapeutic Treatments and Modalities:     1. E Stim Unattended (CPT 15187), Russian stim to IS and SS with heat x 15min     Time-based treatments/modalities:  Therapeutic exercise minutes (CPT 18415): 20 minutes       ASSESSMENT:   Response to treatment: Improved AROM and strength which is resulting in improved function per patient.     PLAN/RECOMMENDATIONS:   Plan for treatment: therapy treatment to continue next visit.  Planned interventions for next visit: continue with current treatment.

## 2019-07-18 ENCOUNTER — APPOINTMENT (OUTPATIENT)
Dept: PHYSICAL THERAPY | Facility: REHABILITATION | Age: 79
End: 2019-07-18
Attending: NURSE PRACTITIONER
Payer: MEDICARE

## 2019-07-23 ENCOUNTER — APPOINTMENT (OUTPATIENT)
Dept: PHYSICAL THERAPY | Facility: REHABILITATION | Age: 79
End: 2019-07-23
Attending: NURSE PRACTITIONER
Payer: MEDICARE

## 2019-07-25 ENCOUNTER — PHYSICAL THERAPY (OUTPATIENT)
Dept: PHYSICAL THERAPY | Facility: REHABILITATION | Age: 79
End: 2019-07-25
Attending: NURSE PRACTITIONER
Payer: MEDICARE

## 2019-07-25 DIAGNOSIS — G89.29 CHRONIC RIGHT SHOULDER PAIN: ICD-10-CM

## 2019-07-25 DIAGNOSIS — M25.511 CHRONIC RIGHT SHOULDER PAIN: ICD-10-CM

## 2019-07-25 PROCEDURE — 97014 ELECTRIC STIMULATION THERAPY: CPT

## 2019-07-25 PROCEDURE — 97110 THERAPEUTIC EXERCISES: CPT

## 2019-07-25 NOTE — OP THERAPY DISCHARGE SUMMARY
Outpatient Physical Therapy  DISCHARGE SUMMARY NOTE      [facility] Physical Therapy [address].  [address]  [place] [address]  Phone:  [phone]  Fax:  [phone]    Date of Visit: 07/25/2019    Patient: Wilmar Zaragoza  YOB: 1940  MRN: [id number]     Referring Provider: Elias Dooley, YAHAIRA.P.RLynnetteNLynnette  [address]  [facility] [address]  [place] [address]   Referring Diagnosis Pain in right shoulder [M25.511];Other chronic pain [G89.29]     Physical Therapy Occurrence Codes    Date of onset of impairment:  7/1/17   Date physical therapy care plan established or reviewed:  7/1/19   Date physical therapy treatment started:  7/1/19          Functional Limitations and Severity Modifiers      Goal:     Discharge:         Your patient is being discharged from Physical Therapy with the following comments:   · Goals met    Comments:  Patient has met all goals. Patient has returned to swimming without pain .     Recommendations:  D/C at this time to HEP.     Treasure Santillan, PT, DPT    Date: 7/25/2019

## 2019-07-25 NOTE — OP THERAPY DAILY TREATMENT
Outpatient Physical Therapy  DAILY TREATMENT     [facility] Physical Therapy [address].  [address]  [place] [address]  Phone:  [phone]  Fax:  [phone]    Date: 07/25/2019    Patient: Wilmar Zaragoza  YOB: 1940  MRN: [id number]     Time Calculation  Start time: 0355  Stop time: 0445 Time Calculation (min): 50 minutes     Chief Complaint: Shoulder Problem    Visit #: 4    SUBJECTIVE:  I am feeling much better. Ready for boxing    OBJECTIVE:    Therapeutic Exercises (CPT 01923):     1. UBE x 5 minutes     2. Rows     3. Lat pulls     4. Wall slides     5. [place]     6. Sidelying abduction     7. Standing GH flexion with level 0 band     8. Wall push up with press     9. Wall slide with lift off     10. UPOC due 8/23/19    Therapeutic Treatments and Modalities:     1. E Stim Unattended (CPT 98092), Russian stim to IS and SS with heat x 15min     Time-based treatments/modalities:  Therapeutic exercise minutes (CPT 70400): 15 minutes       ASSESSMENT:   Response to treatment: Patient reports he has returned to swimming and has no shoulder pain.  All LTGs are met at this time.     PLAN/RECOMMENDATIONS:   Plan for treatment: discharge patient due to accomplished goals.  Planned interventions for next visit:.

## 2019-07-26 ENCOUNTER — OFFICE VISIT (OUTPATIENT)
Dept: MEDICAL GROUP | Facility: PHYSICIAN GROUP | Age: 79
End: 2019-07-26
Payer: MEDICARE

## 2019-07-26 VITALS
DIASTOLIC BLOOD PRESSURE: 70 MMHG | RESPIRATION RATE: 14 BRPM | OXYGEN SATURATION: 96 % | WEIGHT: 146 LBS | HEART RATE: 66 BPM | SYSTOLIC BLOOD PRESSURE: 130 MMHG | BODY MASS INDEX: 24.32 KG/M2 | TEMPERATURE: 98.2 F | HEIGHT: 65 IN

## 2019-07-26 DIAGNOSIS — C61 PROSTATE CANCER (HCC): ICD-10-CM

## 2019-07-26 DIAGNOSIS — N40.1 BENIGN NON-NODULAR PROSTATIC HYPERPLASIA WITH LOWER URINARY TRACT SYMPTOMS: ICD-10-CM

## 2019-07-26 DIAGNOSIS — E11.9 TYPE 2 DIABETES MELLITUS WITHOUT COMPLICATION, WITHOUT LONG-TERM CURRENT USE OF INSULIN (HCC): ICD-10-CM

## 2019-07-26 PROCEDURE — 99214 OFFICE O/P EST MOD 30 MIN: CPT | Performed by: PHYSICIAN ASSISTANT

## 2019-07-27 ENCOUNTER — HOSPITAL ENCOUNTER (OUTPATIENT)
Dept: LAB | Facility: MEDICAL CENTER | Age: 79
End: 2019-07-27
Attending: PHYSICIAN ASSISTANT
Payer: MEDICARE

## 2019-07-27 ENCOUNTER — HOSPITAL ENCOUNTER (OUTPATIENT)
Dept: LAB | Facility: MEDICAL CENTER | Age: 79
End: 2019-07-27
Attending: NURSE PRACTITIONER
Payer: MEDICARE

## 2019-07-27 DIAGNOSIS — E11.9 TYPE 2 DIABETES MELLITUS WITHOUT COMPLICATION, WITHOUT LONG-TERM CURRENT USE OF INSULIN (HCC): ICD-10-CM

## 2019-07-27 DIAGNOSIS — N40.1 BENIGN NON-NODULAR PROSTATIC HYPERPLASIA WITH LOWER URINARY TRACT SYMPTOMS: ICD-10-CM

## 2019-07-27 DIAGNOSIS — C61 PROSTATE CANCER (HCC): ICD-10-CM

## 2019-07-27 LAB
ALBUMIN SERPL BCP-MCNC: 4.2 G/DL (ref 3.2–4.9)
ALBUMIN/GLOB SERPL: 1.4 G/DL
ALP SERPL-CCNC: 60 U/L (ref 30–99)
ALT SERPL-CCNC: 17 U/L (ref 2–50)
ANION GAP SERPL CALC-SCNC: 9 MMOL/L (ref 0–11.9)
AST SERPL-CCNC: 18 U/L (ref 12–45)
BASOPHILS # BLD AUTO: 1 % (ref 0–1.8)
BASOPHILS # BLD: 0.04 K/UL (ref 0–0.12)
BILIRUB SERPL-MCNC: 0.5 MG/DL (ref 0.1–1.5)
BUN SERPL-MCNC: 23 MG/DL (ref 8–22)
CALCIUM SERPL-MCNC: 9.5 MG/DL (ref 8.5–10.5)
CHLORIDE SERPL-SCNC: 103 MMOL/L (ref 96–112)
CHOLEST SERPL-MCNC: 219 MG/DL (ref 100–199)
CO2 SERPL-SCNC: 26 MMOL/L (ref 20–33)
CREAT SERPL-MCNC: 0.87 MG/DL (ref 0.5–1.4)
EOSINOPHIL # BLD AUTO: 0.11 K/UL (ref 0–0.51)
EOSINOPHIL NFR BLD: 2.7 % (ref 0–6.9)
ERYTHROCYTE [DISTWIDTH] IN BLOOD BY AUTOMATED COUNT: 46.5 FL (ref 35.9–50)
EST. AVERAGE GLUCOSE BLD GHB EST-MCNC: 148 MG/DL
FASTING STATUS PATIENT QL REPORTED: NORMAL
GLOBULIN SER CALC-MCNC: 3.1 G/DL (ref 1.9–3.5)
GLUCOSE SERPL-MCNC: 153 MG/DL (ref 65–99)
HBA1C MFR BLD: 6.8 % (ref 0–5.6)
HCT VFR BLD AUTO: 44.8 % (ref 42–52)
HDLC SERPL-MCNC: 48 MG/DL
HGB BLD-MCNC: 14.5 G/DL (ref 14–18)
IMM GRANULOCYTES # BLD AUTO: 0.01 K/UL (ref 0–0.11)
IMM GRANULOCYTES NFR BLD AUTO: 0.2 % (ref 0–0.9)
LDLC SERPL CALC-MCNC: 128 MG/DL
LYMPHOCYTES # BLD AUTO: 1.46 K/UL (ref 1–4.8)
LYMPHOCYTES NFR BLD: 35.4 % (ref 22–41)
MCH RBC QN AUTO: 30.2 PG (ref 27–33)
MCHC RBC AUTO-ENTMCNC: 32.4 G/DL (ref 33.7–35.3)
MCV RBC AUTO: 93.3 FL (ref 81.4–97.8)
MONOCYTES # BLD AUTO: 0.31 K/UL (ref 0–0.85)
MONOCYTES NFR BLD AUTO: 7.5 % (ref 0–13.4)
NEUTROPHILS # BLD AUTO: 2.2 K/UL (ref 1.82–7.42)
NEUTROPHILS NFR BLD: 53.2 % (ref 44–72)
NRBC # BLD AUTO: 0 K/UL
NRBC BLD-RTO: 0 /100 WBC
PLATELET # BLD AUTO: 212 K/UL (ref 164–446)
PMV BLD AUTO: 11.1 FL (ref 9–12.9)
POTASSIUM SERPL-SCNC: 4.2 MMOL/L (ref 3.6–5.5)
PROT SERPL-MCNC: 7.3 G/DL (ref 6–8.2)
RBC # BLD AUTO: 4.8 M/UL (ref 4.7–6.1)
SODIUM SERPL-SCNC: 138 MMOL/L (ref 135–145)
TRIGL SERPL-MCNC: 216 MG/DL (ref 0–149)
WBC # BLD AUTO: 4.1 K/UL (ref 4.8–10.8)

## 2019-07-27 PROCEDURE — 83036 HEMOGLOBIN GLYCOSYLATED A1C: CPT

## 2019-07-27 PROCEDURE — 84153 ASSAY OF PSA TOTAL: CPT

## 2019-07-27 PROCEDURE — 36415 COLL VENOUS BLD VENIPUNCTURE: CPT

## 2019-07-27 PROCEDURE — 85025 COMPLETE CBC W/AUTO DIFF WBC: CPT

## 2019-07-27 PROCEDURE — 80061 LIPID PANEL: CPT

## 2019-07-27 PROCEDURE — 80053 COMPREHEN METABOLIC PANEL: CPT

## 2019-07-27 PROCEDURE — 84154 ASSAY OF PSA FREE: CPT

## 2019-07-27 RX ORDER — GLIPIZIDE 5 MG/1
TABLET ORAL
Qty: 100 TAB | Refills: 0 | Status: SHIPPED | OUTPATIENT
Start: 2019-07-27 | End: 2019-10-28 | Stop reason: SDUPTHER

## 2019-07-27 RX ORDER — TAMSULOSIN HYDROCHLORIDE 0.4 MG/1
0.8 CAPSULE ORAL DAILY
Qty: 180 CAP | Refills: 0 | Status: SHIPPED | OUTPATIENT
Start: 2019-07-27 | End: 2019-12-01 | Stop reason: SDUPTHER

## 2019-07-27 NOTE — PROGRESS NOTES
Chief Complaint   Patient presents with   • Follow-Up     recent lab results/ check up    • Medication Refill     glipizide, metformin, tamsulon       HISTORY OF PRESENT ILLNESS: Wilmar Zaragoza is an established 78 y.o. male here to discuss the evaluation and management of:    Patient was offered  services.  LIne Teodoro #[id number] translated.    Prostate cancer (HCC)  Benign non-nodular prostatic hyperplasia with lower urinary tract symptoms  Chronic but stable problem.  Symptoms are managed with tamsulosin 0.4 mg tab once daily.  States he is compliant with medication and experience is no side effects or complications medication.  Admits to occasionally having to get up in the middle of the night to void.  Admits to occasionally stopping and starting in mid flow.  Denies fever, chills, nausea, vomiting, pelvic pain, immaturity, dysuria, urgency or frequency.  Patient has a positive medical history for prostate cancer.  PSA is stable.  Will repeat lab work for patient to complete.  He tells me he is asymptomatic and refuses to get treatment.  Patient does not follow-up with the urologist.      Type 2 diabetes mellitus without complication, without long-term current use of insulin ([facility])  Chronic problem.  Patient's hemoglobin A1c from 10/1/2018 was 7.0.  Patient is past due for lab work.  Reprinted lab work for patient to complete for his PCP.  Patient will follow-up in 6 weeks for evaluation.  He tells me he is currently taking glipizide 5 mg tab twice daily and metformin 500 mg tab 3 times daily.  He tells me he is compliant with medications and experiences no side effects or complications of medications.  Patient is requesting a refill during today's appointment.  Denies polyuria, polydipsia, poor wound healing, dizziness, syncope, vision changes, nausea, vomiting, neuropathy.  Patient is past due for retinal screening and diabetic foot exam.   He tells me that his diet is healthy and he has a regular exercise  routine.  States he walks often.        Patient Active Problem List    Diagnosis Date Noted   • Prostate cancer (HCC) 2018   • Chronic right shoulder pain 2018   • Hyperlipidemia 10/13/2017   • Type 2 diabetes mellitus without complication (HCC) 2017   • Essential hypertension 2017   • Benign non-nodular prostatic hyperplasia with lower urinary tract symptoms 2017       Allergies:Patient has no known allergies.    Current Outpatient Prescriptions   Medication Sig Dispense Refill   • metFORMIN (GLUCOPHAGE) 500 MG Tab Take 1 Tab by mouth 3 times a day, with meals. 270 Tab 0   • glipiZIDE (GLUCOTROL) 5 MG Tab TAKE 1 TABLET BY MOUTH TWICE DAILY 100 Tab 0   • tamsulosin (FLOMAX) 0.4 MG capsule Take 2 Caps by mouth every day for 90 days. 180 Cap 0   • Lancets Lancets order: Lancets Sig: use 2x daily and prn ssx high or low sugar. #100 RF x 3 100 Each 3   • benazepril (LOTENSIN) 5 MG Tab Take 1 Tab by mouth every day. 30 Tab 0   • glucose blood strip 1 Strip by Other route 2 Times a Day. 180 Strip 3   • acetaminophen (TYLENOL) 500 MG Tab Take 500-1,000 mg by mouth every 6 hours as needed.       No current facility-administered medications for this visit.        Social History   Substance Use Topics   • Smoking status: Former Smoker     Packs/day: 2.00     Years: 7.00     Quit date: 1959   • Smokeless tobacco: Never Used      Comment: 65 years ago   • Alcohol use No       Family Status   Relation Status   • Sis    • Mo    • Bro Alive   • Bro    • Sis    • Sis    • Sis    • Neg Hx (Not Specified)     Family History   Problem Relation Age of Onset   • Cancer Sister         stomach cancer   • Alcohol/Drug Brother    • Diabetes Sister    • Heart Disease Neg Hx        ROS:  Review of Systems   Constitutional: Negative for fever, chills, weight loss and malaise/fatigue.   HENT: Negative for ear pain, nosebleeds, congestion, sore throat and neck  "pain.    Eyes: Negative for blurred vision.   Respiratory: Negative for cough, sputum production, shortness of breath and wheezing.    Cardiovascular: Negative for chest pain, palpitations, orthopnea and leg swelling.   Gastrointestinal: Negative for heartburn, nausea, vomiting and abdominal pain.   Genitourinary: Negative for dysuria, urgency and frequency.   Musculoskeletal: Negative for myalgias, back pain and joint pain.   Skin: Negative for rash and itching.   Neurological: Negative for dizziness, tingling, tremors, sensory change, focal weakness and headaches.   Endo/Heme/Allergies: Does not bruise/bleed easily.   Psychiatric/Behavioral: Negative for depression, suicidal ideas and memory loss.  The patient is not nervous/anxious and does not have insomnia.    All other systems reviewed and are negative except as in HPI.    Exam: /70 (BP Location: Left arm, Patient Position: Sitting, BP Cuff Size: Adult)   Pulse 66   Temp 36.8 °C (98.2 °F) (Temporal)   Resp 14   Ht 1.66 m (5' 5.35\")   Wt 66.2 kg (146 lb)   SpO2 96%  Body mass index is 24.04 kg/m².  General: Normal appearing. No distress.  HEENT: Normocephalic. Eyes conjunctiva clear lids without ptosis, ears normal shape and contour.  Neck: Supple without JVD or bruit. Thyroid is not enlarged.  Pulmonary: Clear to ausculation.  Normal effort. No rales, ronchi, or wheezing.  Cardiovascular: Regular rate and rhythm without murmur.   Abdomen: Soft, nontender, nondistended.  Neurologic: Grossly nonfocal.  Cranial nerves are normal.   Lymph: No cervical, supraclavicular or axillary lymph nodes are palpable  Skin: Warm and dry.  No rashes or suspicious skin lesions.  Musculoskeletal: Normal gait. No extremity cyanosis, clubbing, or edema.  Psych: Normal mood and affect. Alert and oriented x3. Judgment and insight is normal.    Medical decision-making and discussion:  1. Prostate cancer (HCC)  Chronic but stable problem.  Patient will follow-up with his " PCP in 6 weeks for evaluation.  Patient does not follow-up with urologist.  He tells me he is asymptomatic.  Continue to monitor.    - PSA TOTAL + %FREE; Future    2. Benign non-nodular prostatic hyperplasia with lower urinary tract symptoms  Chronic but stable problem.  Lab work has been ordered to further evaluate patient.  Continue current medication regimen.  Refilled medication.  Patient will follow-up with his PCP in 6 weeks for reevaluation.    - PSA TOTAL + %FREE; Future  - tamsulosin (FLOMAX) 0.4 MG capsule; Take 2 Caps by mouth every day for 90 days.  Dispense: 180 Cap; Refill: 0    3. Type 2 diabetes mellitus without complication, without long-term current use of insulin ([facility])  Hemoglobin A1c from 10/1/2018 was 7.0.  Advised patient to complete lab work.  He verbally consented that he would complete lab work.  Patient will follow-up in 6 weeks to discuss lab work results with his PCP.  Refilled glipizide and metformin for patient.  Patient is past due for retinal screening and diabetic foot exam.    - metFORMIN (GLUCOPHAGE) 500 MG Tab; Take 1 Tab by mouth 3 times a day, with meals.  Dispense: 270 Tab; Refill: 0  - glipiZIDE (GLUCOTROL) 5 MG Tab; TAKE 1 TABLET BY MOUTH TWICE DAILY  Dispense: 100 Tab; Refill: 0      Please note that this dictation was created using voice recognition software. I have made every reasonable attempt to correct obvious errors, but I expect that there are errors of grammar and possibly content that I did not discover before finalizing the note.    Assessment/Plan:  1. Prostate cancer (HCC)  PSA TOTAL + %FREE   2. Benign non-nodular prostatic hyperplasia with lower urinary tract symptoms  PSA TOTAL + %FREE    tamsulosin (FLOMAX) 0.4 MG capsule   3. Type 2 diabetes mellitus without complication, without long-term current use of insulin (HCC)  metFORMIN (GLUCOPHAGE) 500 MG Tab    glipiZIDE (GLUCOTROL) 5 MG Tab       No Follow-up on file.

## 2019-07-29 ENCOUNTER — APPOINTMENT (OUTPATIENT)
Dept: PHYSICAL THERAPY | Facility: REHABILITATION | Age: 79
End: 2019-07-29
Payer: MEDICARE

## 2019-07-29 LAB
PSA FREE MFR SERPL: 12 %
PSA FREE SERPL-MCNC: 0.8 NG/ML
PSA SERPL-MCNC: 6.9 NG/ML (ref 0–4)

## 2019-07-31 ENCOUNTER — TELEPHONE (OUTPATIENT)
Dept: MEDICAL GROUP | Facility: PHYSICIAN GROUP | Age: 79
End: 2019-07-31

## 2019-07-31 NOTE — TELEPHONE ENCOUNTER
----- Message from BRIAN Navarro sent at 7/31/2019 10:34 AM PDT -----  Please call pt and give lab results: PSA continues to be stable at 6.9.  Would recommend follow-up with a urologist.  We can discuss this further at your appointment in September, she is like to see urology let me know and I will send a referral immediately.

## 2019-07-31 NOTE — TELEPHONE ENCOUNTER
Phone Number Called: [phone] (home)       Call outcome: spoke to patient regarding message below    Message: Lola spoke to pt, as he needed Hebrew speaking assistant.  Pt does not want referral to urology at this time.

## 2019-08-06 ENCOUNTER — APPOINTMENT (OUTPATIENT)
Dept: PHYSICAL THERAPY | Facility: REHABILITATION | Age: 79
End: 2019-08-06
Attending: NURSE PRACTITIONER
Payer: MEDICARE

## 2019-08-08 ENCOUNTER — TELEPHONE (OUTPATIENT)
Dept: MEDICAL GROUP | Facility: PHYSICIAN GROUP | Age: 79
End: 2019-08-08

## 2019-08-08 DIAGNOSIS — I10 ESSENTIAL HYPERTENSION: ICD-10-CM

## 2019-08-08 DIAGNOSIS — E11.9 TYPE 2 DIABETES MELLITUS WITHOUT COMPLICATION, WITHOUT LONG-TERM CURRENT USE OF INSULIN (HCC): ICD-10-CM

## 2019-08-08 RX ORDER — BENAZEPRIL HYDROCHLORIDE 5 MG/1
5 TABLET ORAL DAILY
Qty: 90 TAB | Refills: 3 | Status: SHIPPED | OUTPATIENT
Start: 2019-08-08 | End: 2020-03-19 | Stop reason: SDUPTHER

## 2019-08-08 NOTE — TELEPHONE ENCOUNTER
Patient came in asking to have his medication sent to a different pharmacy. Medication is benazepril (LOTENSIN) 5 MG Tabbenazepril (LOTENSIN) 5 MG Tab    He would like it sent to the [address]

## 2019-08-08 NOTE — TELEPHONE ENCOUNTER
Was the patient seen in the last year in this department? Yes    Does patient have an active prescription for medications requested? Yes last rx 270 qty on 7/27/19    Received Request Via: Pharmacy

## 2019-09-04 ENCOUNTER — TELEPHONE (OUTPATIENT)
Dept: MEDICAL GROUP | Facility: PHYSICIAN GROUP | Age: 79
End: 2019-09-04

## 2019-09-04 NOTE — TELEPHONE ENCOUNTER
ESTABLISHED PATIENT PRE-VISIT PLANNING     Patient was NOT contacted to complete PVP.     Note: Patient will not be contacted if there is no indication to call.     1.  Reviewed notes from the last few office visits within the medical group: Yes    2.  If any orders were placed at last visit or intended to be done for this visit (i.e. 6 mos follow-up), do we have Results/Consult Notes?        •  Labs - Labs ordered, completed on 07/27/19 and results are in chart.   Note: If patient appointment is for lab review and patient did not complete labs, check with provider if OK to reschedule patient until labs completed.       •  Imaging - Imaging was not ordered at last office visit.       •  Referrals - No referrals were ordered at last office visit.    3. Is this appointment scheduled as a Hospital Follow-Up? No    4.  Immunizations were updated in Epic using WebIZ?: Epic matches WebIZ       •  Web Iz Recommendations: FLU, PREVNAR (PCV13) , TDAP and SHINGRIX (Shingles)    5.  Patient is due for the following Health Maintenance Topics:   Health Maintenance Due   Topic Date Due   • IMM PNEUMOCOCCAL VACCINE: 65+ Years (1 of 2 - PCV13) 11/23/2004   • DIABETES MONOFILAMENT / LE EXAM  10/13/2018   • RETINAL SCREENING  12/12/2018   • Annual Wellness Visit  08/30/2019   • URINE ACR / MICROALBUMIN  10/01/2019       - Patient has completed FLU Immunization(s) per WebIZ. Chart has been updated.    6. Orders for overdue Health Maintenance topics pended in Pre-Charting? NO    7.  AHA (MDX) form printed for Provider? No, already completed    8.  Patient was NOT informed to arrive 15 min prior to their scheduled appointment and bring in their medication bottles.

## 2019-09-05 ENCOUNTER — OFFICE VISIT (OUTPATIENT)
Dept: MEDICAL GROUP | Facility: PHYSICIAN GROUP | Age: 79
End: 2019-09-05
Payer: MEDICARE

## 2019-09-05 VITALS
SYSTOLIC BLOOD PRESSURE: 140 MMHG | DIASTOLIC BLOOD PRESSURE: 70 MMHG | BODY MASS INDEX: 24.99 KG/M2 | HEART RATE: 64 BPM | HEIGHT: 65 IN | OXYGEN SATURATION: 98 % | TEMPERATURE: 98 F | WEIGHT: 150 LBS | RESPIRATION RATE: 16 BRPM

## 2019-09-05 DIAGNOSIS — E78.2 MIXED HYPERLIPIDEMIA: ICD-10-CM

## 2019-09-05 DIAGNOSIS — G89.29 CHRONIC RIGHT SHOULDER PAIN: ICD-10-CM

## 2019-09-05 DIAGNOSIS — E11.9 TYPE 2 DIABETES MELLITUS WITHOUT COMPLICATION, WITHOUT LONG-TERM CURRENT USE OF INSULIN (HCC): ICD-10-CM

## 2019-09-05 DIAGNOSIS — M25.511 CHRONIC RIGHT SHOULDER PAIN: ICD-10-CM

## 2019-09-05 PROCEDURE — 99214 OFFICE O/P EST MOD 30 MIN: CPT | Performed by: NURSE PRACTITIONER

## 2019-09-05 RX ORDER — ATORVASTATIN CALCIUM 40 MG/1
40 TABLET, FILM COATED ORAL DAILY
Qty: 90 TAB | Refills: 0 | Status: SHIPPED | OUTPATIENT
Start: 2019-09-05 | End: 2019-09-06 | Stop reason: SDUPTHER

## 2019-09-05 RX ORDER — BENAZEPRIL HYDROCHLORIDE 20 MG/1
TABLET ORAL
COMMUNITY
Start: 2019-07-28 | End: 2020-03-19 | Stop reason: CLARIF

## 2019-09-05 RX ORDER — TRAMADOL HYDROCHLORIDE 50 MG/1
50 TABLET ORAL EVERY 12 HOURS PRN
Qty: 60 TAB | Refills: 0 | Status: SHIPPED | OUTPATIENT
Start: 2019-09-05 | End: 2020-03-19 | Stop reason: SDUPTHER

## 2019-09-05 NOTE — PROGRESS NOTES
Chief Complaint   Patient presents with   • Results     Labs        HISTORY OF THE PRESENT ILLNESS: This is a 78 y.o. Male established patient who presents today for follow up and review of labs.    Essential Hypertension  He takes Benazepril 20 mg for his hypertension without any side effects. Blood pressure in the office today is elevated at 140/70. We did not recheck his blood pressure.    Diabetes Mellitus  He continues to take Glipizide 5 mg and Metformin 500 mg for diabetes mellitus type 2 without any problems or side effects. The patient is due for a retinal screening. Blood work was completed prior to this visit.     Mixed hyperlipidemia  The patients cholesterol was increased on his last lab work. He is interested in taking medication to correct this at this time.     Shoulder pain  The patient has reoccurring shoulder pain. He would like to get a refill of his medication at this time to treat the pain.  States that he did try physical therapy but states that he had worsening pain after physical therapy states that he is not interested in referral or surgery for this issue.  States that small doses of tramadol to keep pain under control.  States that pain is 8-9 out of 10 at times especially in the morning.          Past Medical History:   Diagnosis Date   • Diabetes (HCC)    • Hyperlipidemia    • Hypertension        Past Surgical History:   Procedure Laterality Date   • CATARACT EXTRACTION WITH IOL         Family Status   Relation Name Status   • Sis     • Mo     • Bro  Alive   • Bro     • Sis     • Sis     • Sis     • Neg Hx  (Not Specified)     Family History   Problem Relation Age of Onset   • Cancer Sister         stomach cancer   • Alcohol/Drug Brother    • Diabetes Sister    • Heart Disease Neg Hx        Social History     Tobacco Use   • Smoking status: Former Smoker     Packs/day: 2.00     Years: 7.00     Pack years: 14.00     Last attempt to quit:  1959     Years since quittin.2   • Smokeless tobacco: Never Used   • Tobacco comment: 65 years ago   Substance Use Topics   • Alcohol use: No   • Drug use: No       Allergies: Patient has no known allergies.    Current Outpatient Medications Ordered in Epic   Medication Sig Dispense Refill   • benazepril (LOTENSIN) 20 MG Tab      • benazepril (LOTENSIN) 5 MG Tab Take 1 Tab by mouth every day. 90 Tab 3   • metFORMIN (GLUCOPHAGE) 500 MG Tab Take 1 Tab by mouth 3 times a day, with meals. 270 Tab 0   • glipiZIDE (GLUCOTROL) 5 MG Tab TAKE 1 TABLET BY MOUTH TWICE DAILY 100 Tab 0   • tamsulosin (FLOMAX) 0.4 MG capsule Take 2 Caps by mouth every day for 90 days. 180 Cap 0   • Lancets Lancets order: Lancets Sig: use 2x daily and prn ssx high or low sugar. #100 RF x 3 100 Each 3   • glucose blood strip 1 Strip by Other route 2 Times a Day. 180 Strip 3   • acetaminophen (TYLENOL) 500 MG Tab Take 500-1,000 mg by mouth every 6 hours as needed.       No current Epic-ordered facility-administered medications on file.        Review of Systems   Constitutional: Negative for fever, chills, weight loss and malaise/fatigue.   HENT: Negative for ear pain, nosebleeds, congestion, sore throat and neck pain.    Eyes: Negative for blurred vision.   Respiratory: Negative for cough, sputum production, shortness of breath and wheezing.    Cardiovascular: Negative for chest pain, palpitations, orthopnea and leg swelling.   Gastrointestinal: Negative for heartburn, nausea, vomiting and abdominal pain.   Genitourinary: Negative for dysuria, urgency and frequency.   Musculoskeletal: Right shoulder pain. Negative for myalgias, back pain and joint pain.   Skin: Negative for rash and itching.   Neurological: Negative for dizziness, tingling, tremors, sensory change, focal weakness and headaches.   Endo/Heme/Allergies: Does not bruise/bleed easily.   Psychiatric/Behavioral: Negative for depression, anxiety, or memory loss.     All other  "systems reviewed and are negative except as in HPI.    Exam: /70 (BP Location: Left arm, Patient Position: Sitting, BP Cuff Size: Adult)   Pulse 64   Temp 36.7 °C (98 °F) (Temporal)   Resp 16   Ht 1.66 m (5' 5.35\")   Wt 68 kg (150 lb)   SpO2 98%   General:  Normal appearing. No distress.  Neck: Supple without JVD or bruit. Thyroid is not enlarged.  Pulmonary:  Clear to ausculation.  Normal effort. No rales, ronchi, or wheezing.  Cardiovascular:  Regular rate and rhythm without murmur. Carotid and radial pulses are intact and equal bilaterally.  Neurologic:  Grossly nonfocal  Lymph: No cervical, supraclavicular or axillary lymph nodes are palpable  Skin:  Warm and dry.  No obvious lesions.  Musculoskeletal:  Tenderness to anterior right shoulder, no weakness, clicking, or popping. Normal gait. No extremity cyanosis, clubbing, or edema.  Psych:  Normal mood and affect. Alert and oriented x3. Judgment and insight is normal.    Labs  Hospital Outpatient Visit on 07/27/2019   Component Date Value Ref Range Status   • PSA Total 07/27/2019 6.9* 0.0 - 4.0 ng/mL Final    Comment: INTERPRETIVE INFORMATION: Prostate Specific Antigen  The Roche PSA electrochemiluminescent immunoassay was used.  Results obtained with different test methods or kits cannot be  used interchangeably. The Roche PSA method is approved for use as  an aid in the detection of prostate cancer when used in  conjunction with a digital rectal exam in men age 50 and older.  The Roche PSA method is also indicated for the serial measurement  of PSA to aid in the prognosis and management of prostate cancer  patients. Elevated PSA concentrations can only suggest the  presence of prostate cancer until biopsy is performed. PSA  concentrations can also be elevated in benign prostatic  hyperplasia or inflammatory conditions of the prostate. PSA is  generally not elevated in healthy men or men with non-prostatic  carcinoma.     • Free Psa 07/27/2019 0.8  " ng/mL Final   • % Free Psa 07/27/2019 12  % Final    Comment: INTERPRETIVE INFORMATION: Prostate Specific Antigen, Free  Percentage  [employer] uses the Roche Free PSA electrochemiluminescent immunoassay  method in conjunction with the Roche PSA electrochemiluminescent  immunoassay method to determine the free PSA percentage. Values  obtained with different assay methods should not be used  interchangeably. The free PSA percentage is an aid in  distinguishing prostate cancer from benign prostatic conditions in  men age 50 and older with a total PSA between 3 and 10 ng/mL and  negative digital rectal examination findings. Prostatic biopsy is  required for the diagnosis of cancer.  In patients with total PSA concentrations of 4-10 ng/mL, the  probability of finding prostate cancer on needle biopsy by age in  years is:  %fPSA               50-59    60-69    70 or older  0  - 10%            49%      58%      65%  11 - 18%            27%      34%      41%  19 - 25%            18%      24%      30%  Greater than 25%     9%      12%      16%  Other factors ma                           y help determine the actual risk of prostate  cancer in individual patients.  Performed by [facility],  [address] [place] [id number] [phone]  [email or web address], Kishor Saldivar MD - Lab. Director     Hospital Outpatient Visit on 07/27/2019   Component Date Value Ref Range Status   • WBC 07/27/2019 4.1* 4.8 - 10.8 K/uL Final   • RBC 07/27/2019 4.80  4.70 - 6.10 M/uL Final   • Hemoglobin 07/27/2019 14.5  14.0 - 18.0 g/dL Final   • Hematocrit 07/27/2019 44.8  42.0 - 52.0 % Final   • MCV 07/27/2019 93.3  81.4 - 97.8 fL Final   • MCH 07/27/2019 30.2  27.0 - 33.0 pg Final   • MCHC 07/27/2019 32.4* 33.7 - 35.3 g/dL Final   • RDW 07/27/2019 46.5  35.9 - 50.0 fL Final   • Platelet Count 07/27/2019 212  164 - 446 K/uL Final   • MPV 07/27/2019 11.1  9.0 - 12.9 fL Final   • Neutrophils-Polys 07/27/2019 53.20  44.00 - 72.00 % Final   • Lymphocytes  07/27/2019 35.40  22.00 - 41.00 % Final   • Monocytes 07/27/2019 7.50  0.00 - 13.40 % Final   • Eosinophils 07/27/2019 2.70  0.00 - 6.90 % Final   • Basophils 07/27/2019 1.00  0.00 - 1.80 % Final   • Immature Granulocytes 07/27/2019 0.20  0.00 - 0.90 % Final   • Nucleated RBC 07/27/2019 0.00  /100 WBC Final   • Neutrophils (Absolute) 07/27/2019 2.20  1.82 - 7.42 K/uL Final    Includes immature neutrophils, if present.   • Lymphs (Absolute) 07/27/2019 1.46  1.00 - 4.80 K/uL Final   • Monos (Absolute) 07/27/2019 0.31  0.00 - 0.85 K/uL Final   • Eos (Absolute) 07/27/2019 0.11  0.00 - 0.51 K/uL Final   • Baso (Absolute) 07/27/2019 0.04  0.00 - 0.12 K/uL Final   • Immature Granulocytes (abs) 07/27/2019 0.01  0.00 - 0.11 K/uL Final   • NRBC (Absolute) 07/27/2019 0.00  K/uL Final   • Sodium 07/27/2019 138  135 - 145 mmol/L Final   • Potassium 07/27/2019 4.2  3.6 - 5.5 mmol/L Final   • Chloride 07/27/2019 103  96 - 112 mmol/L Final   • Co2 07/27/2019 26  20 - 33 mmol/L Final   • Anion Gap 07/27/2019 9.0  0.0 - 11.9 Final   • Glucose 07/27/2019 153* 65 - 99 mg/dL Final   • Bun 07/27/2019 23* 8 - 22 mg/dL Final   • Creatinine 07/27/2019 0.87  0.50 - 1.40 mg/dL Final   • Calcium 07/27/2019 9.5  8.5 - 10.5 mg/dL Final   • AST(SGOT) 07/27/2019 18  12 - 45 U/L Final   • ALT(SGPT) 07/27/2019 17  2 - 50 U/L Final   • Alkaline Phosphatase 07/27/2019 60  30 - 99 U/L Final   • Total Bilirubin 07/27/2019 0.5  0.1 - 1.5 mg/dL Final   • Albumin 07/27/2019 4.2  3.2 - 4.9 g/dL Final   • Total Protein 07/27/2019 7.3  6.0 - 8.2 g/dL Final   • Globulin 07/27/2019 3.1  1.9 - 3.5 g/dL Final   • A-G Ratio 07/27/2019 1.4  g/dL Final   • Glycohemoglobin 07/27/2019 6.8* 0.0 - 5.6 % Final    Comment: Increased risk for diabetes:  5.7 -6.4%  Diabetes:  >6.4%  Glycemic control for adults with diabetes:  <7.0%  The above interpretations are per ADA guidelines.  Diagnosis  of diabetes mellitus on the basis of elevated Hemoglobin A1c  should be confirmed  by repeating the Hb A1c test.     • Est Avg Glucose 07/27/2019 148  mg/dL Final    Comment: The eAG calculation is based on the A1c-Derived Daily Glucose  (ADAG) study.  See the ADA's website for additional information.     • Cholesterol,Tot 07/27/2019 219* 100 - 199 mg/dL Final   • Triglycerides 07/27/2019 216* 0 - 149 mg/dL Final   • HDL 07/27/2019 48  >=40 mg/dL Final   • LDL 07/27/2019 128* <100 mg/dL Final   • Fasting Status 07/27/2019 Fasting   Final   • GFR If  07/27/2019 >60  >60 mL/min/1.73 m 2 Final   • GFR If Non  07/27/2019 >60  >60 mL/min/1.73 m 2 Final       PLAN:  1. Chronic right shoulder pain  The patient has chronic right shoulder pain. He requested a refill of his pain medication for management which I will give him at this time.  - tramadol (ULTRAM) 50 MG Tab; Take 1 Tab by mouth every 12 hours as needed for Moderate Pain for up to 30 days.  Dispense: 60 Tab; Refill: 0    2. Mixed hyperlipidemia  The patients cholesterol was elevated. I recommended starting a statin to control this. The patient was agreeable. I prescribed him Atorvastatin 40 mg to treat his hyperlipidemia. We will recheck with future lab tests.  - atorvastatin (LIPITOR) 40 MG Tab; Take 1 Tab by mouth every day.  Dispense: 90 Tab; Refill: 0    3. Type 2 diabetes mellitus without complication, without long-term current use of insulin (HCC)  Patient will follow-up in 6 months or sooner as needed.  Continue current medications.  - POCT Retinal Eye Exam  - REFERRAL TO OPHTHALMOLOGY    Follow-up in 6 months. Patient is encouraged to be seen in the emergency room for chest pain, palpitations, shortness of breath, dizziness, severe abdominal pain or other concerning symptoms.    Please note that this dictation was created using voice recognition software. I have made every reasonable attempt to correct obvious errors, but I expect that there are errors of grammar and possibly content that I did not  discover before finalizing the note.      Assessment/Plan  1. Chronic right shoulder pain  tramadol (ULTRAM) 50 MG Tab   2. Mixed hyperlipidemia  atorvastatin (LIPITOR) 40 MG Tab         I have placed the below orders and discussed them with an approved delegating provider. The MA is performing the below orders under the direction of Dr. Cummins.       Nancy VILLARREAL (Scribe), am scribing for, and in the presence of, PASCUAL Dumont    Electronically signed by: Nancy Ellsworth (Jerrellibzaira), 9/5/2019    Elias VILLARREAL APRN personally performed the services described in this documentation, as scribed by Nancy Ellsworth in my presence, and it is both accurate and complete.

## 2019-09-06 ENCOUNTER — TELEPHONE (OUTPATIENT)
Dept: MEDICAL GROUP | Facility: PHYSICIAN GROUP | Age: 79
End: 2019-09-06

## 2019-09-06 DIAGNOSIS — E78.2 MIXED HYPERLIPIDEMIA: ICD-10-CM

## 2019-09-06 NOTE — TELEPHONE ENCOUNTER
Was the patient seen in the last year in this department? Yes    Does patient have an active prescription for medications requested? No     Received Request Via: Pharmacy     Needs 100 day supply

## 2019-09-09 RX ORDER — ATORVASTATIN CALCIUM 40 MG/1
40 TABLET, FILM COATED ORAL DAILY
Qty: 100 TAB | Refills: 0 | Status: SHIPPED | OUTPATIENT
Start: 2019-09-09 | End: 2019-12-16

## 2019-09-16 DIAGNOSIS — E11.9 TYPE 2 DIABETES MELLITUS WITHOUT COMPLICATION, WITHOUT LONG-TERM CURRENT USE OF INSULIN (HCC): ICD-10-CM

## 2019-09-16 NOTE — TELEPHONE ENCOUNTER
Was the patient seen in the last year in this department? Yes    Does patient have an active prescription for medications requested? No     Received Request Via: Pharmacy     Requesting a 100 day supply.

## 2019-10-28 DIAGNOSIS — E11.9 TYPE 2 DIABETES MELLITUS WITHOUT COMPLICATION, WITHOUT LONG-TERM CURRENT USE OF INSULIN (HCC): ICD-10-CM

## 2019-10-28 RX ORDER — GLIPIZIDE 5 MG/1
TABLET ORAL
Qty: 100 TAB | Refills: 0 | Status: SHIPPED | OUTPATIENT
Start: 2019-10-28 | End: 2019-12-24

## 2019-12-01 DIAGNOSIS — N40.1 BENIGN NON-NODULAR PROSTATIC HYPERPLASIA WITH LOWER URINARY TRACT SYMPTOMS: ICD-10-CM

## 2019-12-02 RX ORDER — TAMSULOSIN HYDROCHLORIDE 0.4 MG/1
0.8 CAPSULE ORAL DAILY
Qty: 180 CAP | Refills: 0 | Status: SHIPPED | OUTPATIENT
Start: 2019-12-02 | End: 2020-03-19 | Stop reason: SDUPTHER

## 2019-12-16 DIAGNOSIS — E78.2 MIXED HYPERLIPIDEMIA: ICD-10-CM

## 2019-12-16 RX ORDER — ATORVASTATIN CALCIUM 40 MG/1
TABLET, FILM COATED ORAL
Qty: 100 TAB | Refills: 0 | Status: SHIPPED | OUTPATIENT
Start: 2019-12-16 | End: 2020-03-19 | Stop reason: SDUPTHER

## 2019-12-24 DIAGNOSIS — E11.9 TYPE 2 DIABETES MELLITUS WITHOUT COMPLICATION, WITHOUT LONG-TERM CURRENT USE OF INSULIN (HCC): ICD-10-CM

## 2019-12-24 RX ORDER — GLIPIZIDE 5 MG/1
TABLET ORAL
Qty: 100 TAB | Refills: 0 | Status: SHIPPED | OUTPATIENT
Start: 2019-12-24 | End: 2020-03-05 | Stop reason: SDUPTHER

## 2020-01-21 ENCOUNTER — TELEPHONE (OUTPATIENT)
Dept: MEDICAL GROUP | Facility: PHYSICIAN GROUP | Age: 80
End: 2020-01-21

## 2020-01-21 NOTE — TELEPHONE ENCOUNTER
Phone Number Called: [phone] (home)     Call outcome: patient unavailable to speak right now will call back later

## 2020-01-21 NOTE — TELEPHONE ENCOUNTER
Patient came into office today asking about rx. Patient said he was out of rx and pharmacy had no refills. Called pharmacy and they did not have rx from 12/02/2019. Called in rx from 12/02/2019 today 01/21/2020 in front of patient. VERONICA

## 2020-01-31 ENCOUNTER — PATIENT OUTREACH (OUTPATIENT)
Dept: HEALTH INFORMATION MANAGEMENT | Facility: OTHER | Age: 80
End: 2020-01-31

## 2020-01-31 NOTE — PROGRESS NOTES
Outcome: No answer no vm     Please transfer to Patient Outreach Team at [phone] when patient returns call.    [facility] Verified: yes    Attempt # 1

## 2020-03-05 ENCOUNTER — TELEPHONE (OUTPATIENT)
Dept: MEDICAL GROUP | Facility: PHYSICIAN GROUP | Age: 80
End: 2020-03-05

## 2020-03-05 DIAGNOSIS — E11.9 TYPE 2 DIABETES MELLITUS WITHOUT COMPLICATION, WITHOUT LONG-TERM CURRENT USE OF INSULIN (HCC): ICD-10-CM

## 2020-03-05 NOTE — TELEPHONE ENCOUNTER
Pt came in today because he thought he had an appt. He is almost out of his Glipizide 5 mg and would like to know if he can get this refilled as soon as possible.

## 2020-03-06 RX ORDER — GLIPIZIDE 5 MG/1
TABLET ORAL
Qty: 100 TAB | Refills: 0 | Status: SHIPPED | OUTPATIENT
Start: 2020-03-06 | End: 2020-03-19 | Stop reason: SDUPTHER

## 2020-03-19 ENCOUNTER — OFFICE VISIT (OUTPATIENT)
Dept: MEDICAL GROUP | Facility: PHYSICIAN GROUP | Age: 80
End: 2020-03-19
Payer: MEDICARE

## 2020-03-19 VITALS
OXYGEN SATURATION: 98 % | DIASTOLIC BLOOD PRESSURE: 60 MMHG | RESPIRATION RATE: 20 BRPM | SYSTOLIC BLOOD PRESSURE: 150 MMHG | HEART RATE: 71 BPM | WEIGHT: 154.2 LBS | BODY MASS INDEX: 25.69 KG/M2 | TEMPERATURE: 98 F | HEIGHT: 65 IN

## 2020-03-19 DIAGNOSIS — I10 ESSENTIAL HYPERTENSION: ICD-10-CM

## 2020-03-19 DIAGNOSIS — G89.29 CHRONIC RIGHT SHOULDER PAIN: ICD-10-CM

## 2020-03-19 DIAGNOSIS — E78.2 MIXED HYPERLIPIDEMIA: ICD-10-CM

## 2020-03-19 DIAGNOSIS — M25.511 CHRONIC RIGHT SHOULDER PAIN: ICD-10-CM

## 2020-03-19 DIAGNOSIS — E11.9 TYPE 2 DIABETES MELLITUS WITHOUT COMPLICATION, WITHOUT LONG-TERM CURRENT USE OF INSULIN (HCC): ICD-10-CM

## 2020-03-19 DIAGNOSIS — G89.29 CHRONIC PAIN OF RIGHT KNEE: ICD-10-CM

## 2020-03-19 DIAGNOSIS — N40.1 BENIGN NON-NODULAR PROSTATIC HYPERPLASIA WITH LOWER URINARY TRACT SYMPTOMS: ICD-10-CM

## 2020-03-19 DIAGNOSIS — M25.561 CHRONIC PAIN OF RIGHT KNEE: ICD-10-CM

## 2020-03-19 LAB
HBA1C MFR BLD: 7.7 % (ref 0–5.6)
INT CON NEG: NEGATIVE
INT CON POS: POSITIVE

## 2020-03-19 PROCEDURE — 83036 HEMOGLOBIN GLYCOSYLATED A1C: CPT | Performed by: NURSE PRACTITIONER

## 2020-03-19 PROCEDURE — 99214 OFFICE O/P EST MOD 30 MIN: CPT | Performed by: NURSE PRACTITIONER

## 2020-03-19 RX ORDER — GLIPIZIDE 5 MG/1
TABLET ORAL
Qty: 100 TAB | Refills: 0 | Status: SHIPPED | OUTPATIENT
Start: 2020-03-19 | End: 2020-06-22

## 2020-03-19 RX ORDER — TRAMADOL HYDROCHLORIDE 50 MG/1
50 TABLET ORAL EVERY 12 HOURS PRN
Qty: 60 TAB | Refills: 0 | Status: SHIPPED | OUTPATIENT
Start: 2020-03-19 | End: 2020-09-30 | Stop reason: SDUPTHER

## 2020-03-19 RX ORDER — TAMSULOSIN HYDROCHLORIDE 0.4 MG/1
0.8 CAPSULE ORAL DAILY
Qty: 180 CAP | Refills: 3 | Status: SHIPPED | OUTPATIENT
Start: 2020-03-19 | End: 2020-06-17

## 2020-03-19 RX ORDER — BENAZEPRIL HYDROCHLORIDE 5 MG/1
5 TABLET ORAL DAILY
Qty: 90 TAB | Refills: 3 | Status: SHIPPED | OUTPATIENT
Start: 2020-03-19 | End: 2021-02-18

## 2020-03-19 RX ORDER — ATORVASTATIN CALCIUM 40 MG/1
40 TABLET, FILM COATED ORAL DAILY
Qty: 100 TAB | Refills: 3 | Status: SHIPPED | OUTPATIENT
Start: 2020-03-19 | End: 2021-02-18

## 2020-03-19 ASSESSMENT — FIBROSIS 4 INDEX: FIB4 SCORE: 1.65

## 2020-03-19 ASSESSMENT — PATIENT HEALTH QUESTIONNAIRE - PHQ9: CLINICAL INTERPRETATION OF PHQ2 SCORE: 0

## 2020-03-19 NOTE — PROGRESS NOTES
Chief Complaint   Patient presents with   • Medication Refill     Benazapril, Atorvastatin, Prostrate Med, and Pain Med for Knee and shoulder       HISTORY OF THE PRESENT ILLNESS: This is a 79 y.o. male established patient who presents today for follow up of his medical problems.    Chronic right shoulder pain  Chronic pain of right knee  Patient continues to have the same chronic right shoulder pain and right knee pain. For his right shoulder, he has pain mainly to the anterior portion of the shoulder. He takes Tramadol for the pain without any side effects. States the last time he took the pain medication was two months ago. He does not take it routinely. He only takes it every so often when the pain is really bad. The tramadol does help with the pain. He has seen ortho a few years ago and was recommended to get surgery for his shoulder and knee, but he declines to get the surgery.    Chronic pain recheck for: right shoulder pain, right knee pain.  Last dose of controlled substance: Two months ago  Chronic pain treated with Tramadol 50 mg taken once every 4-5 days    He  reports no history of alcohol use.  He  reports no history of drug use.    Interval history:   Any major change in health since last appointment? No    Consequences of Chronic Opiate therapy:  (5 A's)  Analgesia: Compared to no treatment or prior treatment, pain is currently significantly improved  Activity: improved  Adverse Events: He denies constipation, dry mouth, itchy skin, nausea and sedation  Aberrant Behaviors: He reports he is taking medication as prescribed and is not veering from agreed treatment regimen or provider recommendations. There have been no inappropriate refills or lost/stolen meds reported.  Affect/Mood: Pain is not impacting patient's mood.  Patient denies depression/anxiety.    Nonnarcotic treatments that are being used: Tylenol, NSAIDs/HODGES-2, ice and heat.     Last imaging: declines at this time.     Opioid Risk Score:  0    Interpretation of Opioid Risk Score   Score 0-3 = Low risk of abuse. Do UDS at least once per year.  Score 4-7 = Moderate risk of abuse. Do UDS 1-4 times per year.  Score 8+ = High risk of abuse. Refer to specialist.    No order of CONTROLLED SUBSTANCE TREATMENT AGREEMENT is found.     UDS Summary     Patient has no health maintenance due at this time        Most recent UDS reviewed today and is consistent with prescribed medications.     I have reviewed the medical records, the Prescription Monitoring Program and I have determined that controlled substance treatment is medically indicated.      Essential hypertension  He continues to take the same dosage of benazepril for his hypertension without any side effects. Blood pressure in the office today was 150/60. He requests for refill of the benazepril.    Type 2 diabetes mellitus without complication, without long-term current use of insulin (HCC)  Chronic in nature and stable. Patient continues to take the same dosage of metformin and glipizide. No reports of any medication side effects or associated symptoms regarding diabetes. A1C in clinic today was 7.7. Denies symptoms of low blood sugar. Denies any numbness or tingling. States his diet has been stable.    Mixed hyperlipidemia  He has been taking atorvastatin 40 mg as prescribed for his hyperlipidemia without myalgias or other side effects. He requests for refill of his atorvastatin.    Benign non-nodular prostatic hyperplasia with lower urinary tract symptoms  Chronic in nature. Patient has history of untreated prostate cancer. States he takes Tamsulosin without any side effects. He reports having night time urination if he is off of the medication. He requests for refill of the Tamsulosin.      Past Medical History:   Diagnosis Date   • Diabetes (HCC)    • Hyperlipidemia    • Hypertension        Past Surgical History:   Procedure Laterality Date   • CATARACT EXTRACTION WITH IOL         Family Status    Relation Name Status   • Sis     • Mo     • Bro  Alive   • Bro     • Sis     • Sis     • Sis     • Neg Hx  (Not Specified)     Family History   Problem Relation Age of Onset   • Cancer Sister         stomach cancer   • Alcohol/Drug Brother    • Diabetes Sister    • Heart Disease Neg Hx        Social History     Tobacco Use   • Smoking status: Former Smoker     Packs/day: 2.00     Years: 7.00     Pack years: 14.00     Last attempt to quit: 1959     Years since quittin.7   • Smokeless tobacco: Never Used   • Tobacco comment: 65 years ago   Substance Use Topics   • Alcohol use: No   • Drug use: No       Allergies: Patient has no known allergies.    Current Outpatient Medications Ordered in Epic   Medication Sig Dispense Refill   • glipiZIDE (GLUCOTROL) 5 MG Tab TOME FAMILIA TABLETA DOS VECES AL RAJENDRA 100 Tab 0   • atorvastatin (LIPITOR) 40 MG Tab Take 1 Tab by mouth every day. 100 Tab 3   • metFORMIN (GLUCOPHAGE) 500 MG Tab Take 1 Tab by mouth 3 times a day, with meals. 300 Tab 0   • tamsulosin (FLOMAX) 0.4 MG capsule Take 2 Caps by mouth every day for 90 days. 180 Cap 3   • tramadol (ULTRAM) 50 MG Tab Take 1 Tab by mouth every 12 hours as needed for Moderate Pain for up to 30 days. 60 Tab 0   • benazepril (LOTENSIN) 5 MG Tab Take 1 Tab by mouth every day. 90 Tab 3   • Lancets Lancets order: Lancets Sig: use 2x daily and prn ssx high or low sugar. #100 RF x 3 100 Each 3   • glucose blood strip 1 Strip by Other route 2 Times a Day. 180 Strip 3   • acetaminophen (TYLENOL) 500 MG Tab Take 500-1,000 mg by mouth every 6 hours as needed.       No current Epic-ordered facility-administered medications on file.        Review of Systems   Constitutional: Negative for fever, chills, weight loss and malaise/fatigue.   HENT: Negative for ear pain, nosebleeds, congestion, sore throat and neck pain.    Respiratory: Negative for cough, sputum production, shortness of breath and  "wheezing.    Cardiovascular: Negative for chest pain, palpitations, orthopnea and leg swelling.   Genitourinary: Night time urination (if off of prostate medication). Negative for dysuria  Musculoskeletal: Chronic right shoulder and right knee pain.   Skin: Negative for rash and itching.   Neurological: Negative for dizziness, tingling, tremors, sensory change, focal weakness and headaches.   Endo/Heme/Allergies: Negative for hypoglycemic symptoms. Does not bruise/bleed easily.   All other systems reviewed and are negative except as in HPI.    Exam: /60 (BP Location: Right arm, Patient Position: Sitting, BP Cuff Size: Adult)   Pulse 71   Temp 36.7 °C (98 °F) (Temporal)   Resp 20   Ht 1.66 m (5' 5.35\")   Wt 69.9 kg (154 lb 3.2 oz)   SpO2 98%   General:  Normal appearing. No distress.  Pulmonary:  Clear to ausculation.  Normal effort. No rales, ronchi, or wheezing.  Cardiovascular:  Regular rate and rhythm without murmur. Carotid and radial pulses are intact and equal bilaterally.  Neurologic:  Grossly nonfocal  Skin:  Warm and dry.  No obvious lesions.  Musculoskeletal:  Normal gait. No extremity cyanosis, clubbing, or edema. Right knee with crepitus, full range of motion of right knee. Right shoulder with limited range of motion, cannot lift right arm more than 95 degrees, has more pain with rotation of the shoulder.  Psych:  Normal mood and affect. Alert and oriented x3. Judgment and insight is normal.    PLAN:    1. Chronic right shoulder pain  - Patient has been stable with current management. We will make no changes for now. Refilled his tramadol. I have reviewed the medical records and the prescription monitoring program. I have determined that the controlled prescription medication is medically indicated. I have advised the patient to keep the medication in a safe place and not to drive while taking the medication. [facility] urine drug screen, controlled substance agreement, and informed consent " are all up to date.  - tramadol (ULTRAM) 50 MG Tab; Take 1 Tab by mouth every 12 hours as needed for Moderate Pain for up to 30 days.  Dispense: 60 Tab; Refill: 0    2. Chronic pain of right knee  - Same as #1    3. Essential hypertension  - Blood pressure is stable and within goal on his current regimen. We will continue the current dosages. I have advised him to avoid salty foods and exercise regularly.   - benazepril (LOTENSIN) 5 MG Tab; Take 1 Tab by mouth every day.  Dispense: 90 Tab; Refill: 3    4. Type 2 diabetes mellitus without complication, without long-term current use of insulin (HCC)  - Patient's A1C in clinic today was 7.7 which is a little elevated compared to last time. Stable on current medications, and we will continue the current dosages. I advised him to make diet changes to improve his glucose levels. Advised him to avoid sweets, decrease his carbohydrate intake, exercise regularly and keep a healthy weight. Labs have been ordered for the next follow up visit.    - POCT  A1C  - glipiZIDE (GLUCOTROL) 5 MG Tab; TOME FAMILIA TABLETA DOS VECES AL RAJENDRA  Dispense: 100 Tab; Refill: 0  - metFORMIN (GLUCOPHAGE) 500 MG Tab; Take 1 Tab by mouth 3 times a day, with meals.  Dispense: 300 Tab; Refill: 0    5. Mixed hyperlipidemia  - Patient has been stable with current management. We will make no changes for now. Refilled his atorvastatin.  - Advised to eat low fat, low carbohydrate and high fiber diet as well as do cardio physical exercise regularly.    - atorvastatin (LIPITOR) 40 MG Tab; Take 1 Tab by mouth every day.  Dispense: 100 Tab; Refill: 3    6. Benign non-nodular prostatic hyperplasia with lower urinary tract symptoms  - Patient needs refill of his tamsulosin, as he will experience night time urination if he is off of the medication. Refilled his tamsulosin.  - tamsulosin (FLOMAX) 0.4 MG capsule; Take 2 Caps by mouth every day for 90 days.  Dispense: 180 Cap; Refill: 3       Follow-up in 4 months or  sooner as needed. Patient is encouraged to be seen in the emergency room for chest pain, palpitations, shortness of breath, dizziness, severe abdominal pain or other concerning symptoms.     Please note that this dictation was created using voice recognition software. I have made every reasonable attempt to correct obvious errors, but I expect that there are errors of grammar and possibly content that I did not discover before finalizing the note.      Assessment/Plan  1. Chronic right shoulder pain  tramadol (ULTRAM) 50 MG Tab   2. Chronic pain of right knee  tramadol (ULTRAM) 50 MG Tab   3. Essential hypertension  benazepril (LOTENSIN) 5 MG Tab   4. Type 2 diabetes mellitus without complication, without long-term current use of insulin (HCC)  POCT  A1C    glipiZIDE (GLUCOTROL) 5 MG Tab    metFORMIN (GLUCOPHAGE) 500 MG Tab   5. Mixed hyperlipidemia  atorvastatin (LIPITOR) 40 MG Tab   6. Benign non-nodular prostatic hyperplasia with lower urinary tract symptoms  tamsulosin (FLOMAX) 0.4 MG capsule         I have placed the below orders and discussed them with an approved delegating provider. The MA is performing the below orders under the direction of Dr. Cummins.       Seth VILLARREAL (Scribe), am scribing for, and in the presence of, PASCUAL Dumont    Electronically signed by: Seth Lindo (Jerrellibe), 3/19/2020    Elias VILLARREAL APRN personally performed the services described in this documentation, as scribed by Seth Lindo in my presence, and it is both accurate and complete.

## 2020-06-19 DIAGNOSIS — E11.9 TYPE 2 DIABETES MELLITUS WITHOUT COMPLICATION, WITHOUT LONG-TERM CURRENT USE OF INSULIN (HCC): ICD-10-CM

## 2020-06-22 RX ORDER — GLIPIZIDE 5 MG/1
TABLET ORAL
Qty: 100 TAB | Refills: 0 | Status: SHIPPED | OUTPATIENT
Start: 2020-06-22 | End: 2020-07-23 | Stop reason: SDUPTHER

## 2020-07-23 ENCOUNTER — OFFICE VISIT (OUTPATIENT)
Dept: MEDICAL GROUP | Facility: PHYSICIAN GROUP | Age: 80
End: 2020-07-23
Payer: MEDICARE

## 2020-07-23 VITALS
SYSTOLIC BLOOD PRESSURE: 140 MMHG | OXYGEN SATURATION: 97 % | RESPIRATION RATE: 18 BRPM | DIASTOLIC BLOOD PRESSURE: 90 MMHG | TEMPERATURE: 98.4 F | HEIGHT: 65 IN | WEIGHT: 146.4 LBS | HEART RATE: 66 BPM | BODY MASS INDEX: 24.39 KG/M2

## 2020-07-23 DIAGNOSIS — E11.9 TYPE 2 DIABETES MELLITUS WITHOUT COMPLICATION, WITHOUT LONG-TERM CURRENT USE OF INSULIN (HCC): ICD-10-CM

## 2020-07-23 DIAGNOSIS — N40.1 BENIGN NON-NODULAR PROSTATIC HYPERPLASIA WITH LOWER URINARY TRACT SYMPTOMS: ICD-10-CM

## 2020-07-23 DIAGNOSIS — E78.2 MIXED HYPERLIPIDEMIA: ICD-10-CM

## 2020-07-23 PROCEDURE — 99214 OFFICE O/P EST MOD 30 MIN: CPT | Performed by: FAMILY MEDICINE

## 2020-07-23 RX ORDER — TAMSULOSIN HYDROCHLORIDE 0.4 MG/1
CAPSULE ORAL
COMMUNITY
Start: 2020-07-14 | End: 2020-07-23 | Stop reason: SDUPTHER

## 2020-07-23 RX ORDER — TAMSULOSIN HYDROCHLORIDE 0.4 MG/1
0.4 CAPSULE ORAL DAILY
Qty: 90 CAP | Refills: 0 | Status: SHIPPED | OUTPATIENT
Start: 2020-07-23 | End: 2020-07-23

## 2020-07-23 RX ORDER — TAMSULOSIN HYDROCHLORIDE 0.4 MG/1
0.4 CAPSULE ORAL 2 TIMES DAILY
Qty: 90 CAP | Refills: 0 | Status: SHIPPED | OUTPATIENT
Start: 2020-07-23 | End: 2021-05-04

## 2020-07-23 RX ORDER — GLIPIZIDE 5 MG/1
5 TABLET ORAL 2 TIMES DAILY
Qty: 100 TAB | Refills: 0 | Status: SHIPPED | OUTPATIENT
Start: 2020-07-23 | End: 2020-09-09

## 2020-07-23 ASSESSMENT — FIBROSIS 4 INDEX: FIB4 SCORE: 1.65

## 2020-07-23 NOTE — PROGRESS NOTES
Subjective:     Chief Complaint   Patient presents with   • Medication Refill     flomax        HPI:   Wilmar presents today to discuss the following.  Prior PCP: PASCUAL Decker    Benign non-nodular prostatic hyperplasia with lower urinary tract symptoms  This is a chronic problem  The patient has had this for years  Takes Flomax  Requesting refill today     Type 2 diabetes mellitus without complication (CMS-HCC)  This is a chronic issue  Takes Metformin 500mg BID and glipizide 5mg BID  Last A1c was 3/20 at 7.7  Last ophthalmology visit was many years ago      Hyperlipidemia  This is a chronic problem  Last lipid panel   He is exercising and eating healthy       Past Medical History:   Diagnosis Date   • Diabetes (HCC)    • Hyperlipidemia    • Hypertension        Social History     Tobacco Use   • Smoking status: Former Smoker     Packs/day: 2.00     Years: 7.00     Pack years: 14.00     Last attempt to quit: 1959     Years since quittin.1   • Smokeless tobacco: Never Used   • Tobacco comment: 65 years ago   Substance Use Topics   • Alcohol use: No   • Drug use: No       Current Outpatient Medications Ordered in Epic   Medication Sig Dispense Refill   • metFORMIN (GLUCOPHAGE) 500 MG Tab Take 1 Tab by mouth 2 times a day. 90 Tab 0   • glipiZIDE (GLUCOTROL) 5 MG Tab Take 1 Tab by mouth 2 times a day. 100 Tab 0   • tamsulosin (FLOMAX) 0.4 MG capsule Take 1 Cap by mouth 2 Times a Day. 90 Cap 0   • atorvastatin (LIPITOR) 40 MG Tab Take 1 Tab by mouth every day. 100 Tab 3   • benazepril (LOTENSIN) 5 MG Tab Take 1 Tab by mouth every day. 90 Tab 3   • Lancets Lancets order: Lancets Sig: use 2x daily and prn ssx high or low sugar. #100 RF x 3 100 Each 3   • glucose blood strip 1 Strip by Other route 2 Times a Day. 180 Strip 3   • acetaminophen (TYLENOL) 500 MG Tab Take 500-1,000 mg by mouth every 6 hours as needed.       No current Epic-ordered facility-administered medications on file.   "      Allergies:  Patient has no known allergies.    Health Maintenance: Completed    ROS:  Gen: no fevers/chills, no changes in weight  Eyes: no changes in vision  ENT: no sore throat, no hearing loss, no bloody nose  Pulm: no sob, no cough  CV: no chest pain, no palpitations      Objective:     Exam:  /90 (BP Location: Left arm, Patient Position: Sitting, BP Cuff Size: Adult)   Pulse 66   Temp 36.9 °C (98.4 °F) (Temporal)   Resp 18   Ht 1.651 m (5' 5\")   Wt 66.4 kg (146 lb 6.4 oz)   SpO2 97%   BMI 24.36 kg/m²  Body mass index is 24.36 kg/m².    Constitutional: Alert, no distress, well-groomed.  Skin: Warm, dry, good turgor, no rashes in visible areas.  Eye: Equal, round and reactive, conjunctiva clear, lids normal.  ENMT: Lips without lesions, good dentition, moist mucous membranes.  Neck: Trachea midline, no masses, no thyromegaly.  Respiratory: Unlabored respiratory effort, no cough.  MSK: Normal gait, moves all extremities.  Neuro: Grossly non-focal.   Psych: Alert and oriented x3, normal affect and mood.      Assessment & Plan:     79 y.o. male with the following -     1. Benign non-nodular prostatic hyperplasia with lower urinary tract symptoms  This is a chronic condition.  The patient has a history of BPH currently well controlled with Flomax 0.4 mg twice daily.  He is requesting medical reconciliation today.  This was completed today.  A refill was authorized today.  Denies any acute issues about this today.  - tamsulosin (FLOMAX) 0.4 MG capsule; Take 1 Cap by mouth 2 Times a Day.  Dispense: 90 Cap; Refill: 0    2. Type 2 diabetes mellitus without complication, without long-term current use of insulin (HCC)  This is a chronic condition.  The patient has had type 2 diabetes for many years.  Med rec was also requested for his diabetic regimen.  He is actually taking metformin 500 mg twice daily as well as glipizide 5 mg twice daily.  Refills were authorized today.  He will be referred to " ophthalmology today.  The patient is on statin therapy.  Last lipid panel was checked July 2019.  Blood pressure is within goal less than 140/90.  The patient is on ACE inhibitor already.  He checks his feet daily.  Last A1c was March 2020 at 7.7.  - metFORMIN (GLUCOPHAGE) 500 MG Tab; Take 1 Tab by mouth 2 times a day.  Dispense: 90 Tab; Refill: 0  - glipiZIDE (GLUCOTROL) 5 MG Tab; Take 1 Tab by mouth 2 times a day.  Dispense: 100 Tab; Refill: 0  - HEMOGLOBIN A1C; Future  - CBC WITHOUT DIFFERENTIAL; Future  - Basic Metabolic Panel; Future  - Lipid Profile; Future  - REFERRAL TO OPHTHALMOLOGY    3. Mixed hyperlipidemia  This is a chronic condition.  The patient has a history of hyperlipidemia.  Last lipid panel was checked July 2019.  He has tried to reinforce lifestyle change.  He is also on Lipitor 40 mg daily.  We will repeat labs today.  Continue with lifestyle change.      Return in about 3 months (around 10/23/2020) for DM.    Please note that this dictation was created using voice recognition software. I have made every reasonable attempt to correct obvious errors, but I expect that there are errors of grammar and possibly content that I did not discover before finalizing the note.

## 2020-07-23 NOTE — ASSESSMENT & PLAN NOTE
This is a chronic issue  Takes Metformin 500mg BID and glipizide 5mg BID  Last A1c was 3/20 at 7.7  Last ophthalmology visit was many years ago

## 2020-07-23 NOTE — ASSESSMENT & PLAN NOTE
This is a chronic problem  The patient has had this for years  Takes Flomax  Requesting refill today

## 2020-07-28 ENCOUNTER — HOSPITAL ENCOUNTER (OUTPATIENT)
Dept: LAB | Facility: MEDICAL CENTER | Age: 80
End: 2020-07-28
Attending: FAMILY MEDICINE
Payer: MEDICARE

## 2020-07-28 DIAGNOSIS — E11.9 TYPE 2 DIABETES MELLITUS WITHOUT COMPLICATION, WITHOUT LONG-TERM CURRENT USE OF INSULIN (HCC): ICD-10-CM

## 2020-07-28 LAB
ANION GAP SERPL CALC-SCNC: 11 MMOL/L (ref 7–16)
BUN SERPL-MCNC: 16 MG/DL (ref 8–22)
CALCIUM SERPL-MCNC: 9.3 MG/DL (ref 8.5–10.5)
CHLORIDE SERPL-SCNC: 103 MMOL/L (ref 96–112)
CHOLEST SERPL-MCNC: 255 MG/DL (ref 100–199)
CO2 SERPL-SCNC: 24 MMOL/L (ref 20–33)
CREAT SERPL-MCNC: 0.8 MG/DL (ref 0.5–1.4)
ERYTHROCYTE [DISTWIDTH] IN BLOOD BY AUTOMATED COUNT: 44.1 FL (ref 35.9–50)
EST. AVERAGE GLUCOSE BLD GHB EST-MCNC: 148 MG/DL
FASTING STATUS PATIENT QL REPORTED: NORMAL
GLUCOSE SERPL-MCNC: 164 MG/DL (ref 65–99)
HBA1C MFR BLD: 6.8 % (ref 0–5.6)
HCT VFR BLD AUTO: 44.6 % (ref 42–52)
HDLC SERPL-MCNC: 40 MG/DL
HGB BLD-MCNC: 15.1 G/DL (ref 14–18)
LDLC SERPL CALC-MCNC: ABNORMAL MG/DL
MCH RBC QN AUTO: 30.3 PG (ref 27–33)
MCHC RBC AUTO-ENTMCNC: 33.9 G/DL (ref 33.7–35.3)
MCV RBC AUTO: 89.6 FL (ref 81.4–97.8)
PLATELET # BLD AUTO: 203 K/UL (ref 164–446)
PMV BLD AUTO: 10.9 FL (ref 9–12.9)
POTASSIUM SERPL-SCNC: 4.5 MMOL/L (ref 3.6–5.5)
RBC # BLD AUTO: 4.98 M/UL (ref 4.7–6.1)
SODIUM SERPL-SCNC: 138 MMOL/L (ref 135–145)
TRIGL SERPL-MCNC: 419 MG/DL (ref 0–149)
WBC # BLD AUTO: 4.8 K/UL (ref 4.8–10.8)

## 2020-07-28 PROCEDURE — 85027 COMPLETE CBC AUTOMATED: CPT

## 2020-07-28 PROCEDURE — 80061 LIPID PANEL: CPT

## 2020-07-28 PROCEDURE — 36415 COLL VENOUS BLD VENIPUNCTURE: CPT

## 2020-07-28 PROCEDURE — 80048 BASIC METABOLIC PNL TOTAL CA: CPT

## 2020-07-28 PROCEDURE — 83036 HEMOGLOBIN GLYCOSYLATED A1C: CPT

## 2020-07-29 DIAGNOSIS — E11.9 TYPE 2 DIABETES MELLITUS WITHOUT COMPLICATION, WITHOUT LONG-TERM CURRENT USE OF INSULIN (HCC): ICD-10-CM

## 2020-07-29 NOTE — TELEPHONE ENCOUNTER
Request 90 day supply.    Received request via: Pharmacy    Was the patient seen in the last year in this department? Yes    Does the patient have an active prescription (recently filled or refills available) for medication(s) requested? No

## 2020-07-30 ENCOUNTER — TELEPHONE (OUTPATIENT)
Dept: MEDICAL GROUP | Facility: PHYSICIAN GROUP | Age: 80
End: 2020-07-30

## 2020-07-30 NOTE — TELEPHONE ENCOUNTER
----- Message from Trevor Zapien M.D. sent at 7/30/2020 11:53 AM PDT -----  His A1c is better at 6.8 when it used to be 7.7.  Continue current regimen    Cholesterol is elevated at 255 when it should be less than 200.  Your triglycerides are also elevated at 400 when they should be less than 150.    I recommend healthy eating, exercise and weight loss    Thank you,    Dr. AMARI Zapien  Family Medicine Physician  [facility]  [phone]

## 2020-07-30 NOTE — TELEPHONE ENCOUNTER
Phone Number Called: [phone] (home)       Call outcome: Spoke with spouse Ellie with help of  Madelaine ID#[id number]    Message: Informed spouse of results, spouse understood, no further questions at this time.

## 2020-09-30 ENCOUNTER — OFFICE VISIT (OUTPATIENT)
Dept: MEDICAL GROUP | Facility: PHYSICIAN GROUP | Age: 80
End: 2020-09-30
Payer: MEDICARE

## 2020-09-30 VITALS
SYSTOLIC BLOOD PRESSURE: 140 MMHG | HEART RATE: 69 BPM | RESPIRATION RATE: 24 BRPM | WEIGHT: 150.4 LBS | DIASTOLIC BLOOD PRESSURE: 60 MMHG | BODY MASS INDEX: 25.06 KG/M2 | TEMPERATURE: 97.9 F | OXYGEN SATURATION: 98 % | HEIGHT: 65 IN

## 2020-09-30 DIAGNOSIS — S39.012A STRAIN OF LUMBAR PARASPINOUS MUSCLE, INITIAL ENCOUNTER: ICD-10-CM

## 2020-09-30 PROCEDURE — 99214 OFFICE O/P EST MOD 30 MIN: CPT | Performed by: PHYSICIAN ASSISTANT

## 2020-09-30 RX ORDER — TRAMADOL HYDROCHLORIDE 50 MG/1
50 TABLET ORAL EVERY 12 HOURS PRN
Qty: 14 TAB | Refills: 0 | Status: SHIPPED | OUTPATIENT
Start: 2020-09-30 | End: 2020-11-16 | Stop reason: SDUPTHER

## 2020-09-30 ASSESSMENT — FIBROSIS 4 INDEX: FIB4 SCORE: 1.72

## 2020-09-30 NOTE — PROGRESS NOTES
Chief Complaint   Patient presents with   • Pain     back pain lower left of back        HISTORY OF PRESENT ILLNESS: Wilmar Zaragoza is an established 79 y.o. male here to discuss the evaluation and management of:    Patient was offered  services and accepted.  Manual (reference # [id number]) translated during today's appointment.    Patient states this morning when getting out of the truck at 5:45 AM he stepped the wrong way and felt a sharp pain of left lower back.  States pain symptoms is only felt bending over, with twisting, climbing stairs, sitting for too long and rising to a standing position.  Describes pain as a intermittent sharp pain.  During today's appointment patient is pointing at back about 2.5 inches left of L2/L3 region.  Denies tenderness to palpation.  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  He tells me that he put some Tiger balm on the area of concern today with no improvement in symptoms.  He denies fever, chills, nausea, vomiting, pelvic pain, abnormal urethral discharge, dysuria, hematuria, urgency, frequency, radiating pain to groin area, saddle paresthesia, incontinence, gait abnormalities.  He denies trauma.  Patient requesting refill for tramadol.      Patient Active Problem List    Diagnosis Date Noted   • Chronic pain of right knee 03/19/2020   • Prostate cancer (HCC) 05/24/2018   • Chronic right shoulder pain 01/16/2018   • Hyperlipidemia 10/13/2017   • Type 2 diabetes mellitus without complication ([facility]) 09/08/2017   • Essential hypertension 09/08/2017   • Benign non-nodular prostatic hyperplasia with lower urinary tract symptoms 09/08/2017       Allergies:Patient has no known allergies.    Current Outpatient Medications   Medication Sig Dispense Refill   • tramadol (ULTRAM) 50 MG Tab Take 1 Tab by mouth every 12 hours as needed for Moderate Pain for up to 7 days. 14 Tab 0    • glipiZIDE (GLUCOTROL) 5 MG Tab TOME FAMILIA TABLETA DOS VECES AL RAJENDRA 200 Tab 0   • metFORMIN (GLUCOPHAGE) 500 MG Tab TOME FAMILIA TABLETA DOS VECES AL RAJENDRA 200 Tab 0   • tamsulosin (FLOMAX) 0.4 MG capsule Take 1 Cap by mouth 2 Times a Day. 90 Cap 0   • atorvastatin (LIPITOR) 40 MG Tab Take 1 Tab by mouth every day. 100 Tab 3   • benazepril (LOTENSIN) 5 MG Tab Take 1 Tab by mouth every day. 90 Tab 3   • Lancets Lancets order: Lancets Sig: use 2x daily and prn ssx high or low sugar. #100 RF x 3 100 Each 3   • glucose blood strip 1 Strip by Other route 2 Times a Day. 180 Strip 3   • acetaminophen (TYLENOL) 500 MG Tab Take 500-1,000 mg by mouth every 6 hours as needed.       No current facility-administered medications for this visit.        Social History     Tobacco Use   • Smoking status: Former Smoker     Packs/day: 2.00     Years: 7.00     Pack years: 14.00     Quit date: 1959     Years since quittin.3   • Smokeless tobacco: Never Used   • Tobacco comment: 65 years ago   Substance Use Topics   • Alcohol use: No   • Drug use: No       Family Status   Relation Name Status   • Sis     • Mo     • Bro  Alive   • Bro     • Sis     • Sis     • Sis     • Neg Hx  (Not Specified)     Family History   Problem Relation Age of Onset   • Cancer Sister         stomach cancer   • Alcohol/Drug Brother    • Diabetes Sister    • Heart Disease Neg Hx        ROS:  Review of Systems   Constitutional: Negative for fever, chills, weight loss and malaise/fatigue.   HENT: Negative for ear pain, nosebleeds, congestion, sore throat and neck pain.    Eyes: Negative for blurred vision.   Respiratory: Negative for cough, sputum production, shortness of breath and wheezing.    Cardiovascular: Negative for chest pain, palpitations, orthopnea and leg swelling.   Gastrointestinal: Negative for heartburn, nausea, vomiting and abdominal pain.   Genitourinary: Negative for dysuria, urgency and  "frequency.   Musculoskeletal: Negative for myalgias and joint pain.  Positive for back pain.  Skin: Negative for rash and itching.   Neurological: Negative for dizziness, tingling, tremors, sensory change, focal weakness and headaches.   Endo/Heme/Allergies: Does not bruise/bleed easily.   Psychiatric/Behavioral: Negative for depression, suicidal ideas and memory loss.  The patient is not nervous/anxious and does not have insomnia.    All other systems reviewed and are negative except as in HPI.    Exam: /60 (BP Location: Left arm, Patient Position: Sitting, BP Cuff Size: Adult)   Pulse 69   Temp 36.6 °C (97.9 °F) (Temporal)   Resp (!) 24   Ht 1.651 m (5' 5\")   Wt 68.2 kg (150 lb 6.4 oz)   SpO2 98%  Body mass index is 25.03 kg/m².  General: Normal appearing. No distress.  HEENT: Normocephalic. Eyes conjunctiva clear lids without ptosis, ears normal shape and contour.  Neck: Supple without JVD. Thyroid is not enlarged.  Pulmonary: Clear to ausculation.  Normal effort. No rales, ronchi, or wheezing.  Cardiovascular: Regular rate and rhythm without murmur.   Abdomen: Nondistended.   Neurologic: Grossly nonfocal.  Cranial nerves are normal.   Skin: Warm and dry.  No rashes or suspicious skin lesions.  Musculoskeletal: Normal gait. No extremity cyanosis, clubbing, or edema.  Patient's lumbar paraspinal muscles are nontender to palpation.  Spinal column is nontender to palpation.  When patient rises from a sitting to standing position, twisting torso or with bending patient appears in pain.  Psych: Normal mood and affect. Alert and oriented x3. Judgment and insight is normal.    Medical decision-making and discussion:  1. Strain of lumbar paraspinous muscle, initial encounter  Refilled tramadol for patient.  Patient has been prescribed tramadol in the past and tolerated medication well.  He denies alcohol abuse illicit drug use.  Advised patient to not drink alcohol, combine other sedating medications or " operate machine while taking prescribe an occasion.  Patient good to plan.  Advised patient to use over-the-counter salonpos patches, stretch, rest, over-the-counter analgesics, and heat as needed.  Advised patient to use proper body mechanics.     Patient states his boss understands that he cannot return to work until pain symptoms resolved.  Patient states once he feels better he will return to work.    - tramadol (ULTRAM) 50 MG Tab; Take 1 Tab by mouth every 12 hours as needed for Moderate Pain for up to 7 days.  Dispense: 14 Tab; Refill: 0      Please note that this dictation was created using voice recognition software. I have made every reasonable attempt to correct obvious errors, but I expect that there are errors of grammar and possibly content that I did not discover before finalizing the note.    Assessment/Plan:  1. Strain of lumbar paraspinous muscle, initial encounter  tramadol (ULTRAM) 50 MG Tab       Return in about 3 months (around 12/30/2020).

## 2020-11-16 ENCOUNTER — OFFICE VISIT (OUTPATIENT)
Dept: MEDICAL GROUP | Facility: PHYSICIAN GROUP | Age: 80
End: 2020-11-16
Payer: MEDICARE

## 2020-11-16 VITALS
HEIGHT: 65 IN | OXYGEN SATURATION: 96 % | TEMPERATURE: 98.2 F | BODY MASS INDEX: 25.29 KG/M2 | RESPIRATION RATE: 16 BRPM | WEIGHT: 151.8 LBS | HEART RATE: 71 BPM | SYSTOLIC BLOOD PRESSURE: 140 MMHG | DIASTOLIC BLOOD PRESSURE: 62 MMHG

## 2020-11-16 DIAGNOSIS — E11.9 TYPE 2 DIABETES MELLITUS WITHOUT COMPLICATION, WITHOUT LONG-TERM CURRENT USE OF INSULIN (HCC): ICD-10-CM

## 2020-11-16 DIAGNOSIS — M25.511 CHRONIC RIGHT SHOULDER PAIN: ICD-10-CM

## 2020-11-16 DIAGNOSIS — C61 MALIGNANT NEOPLASM OF PROSTATE (HCC): ICD-10-CM

## 2020-11-16 DIAGNOSIS — S39.012A STRAIN OF LUMBAR PARASPINOUS MUSCLE, INITIAL ENCOUNTER: ICD-10-CM

## 2020-11-16 DIAGNOSIS — M54.50 CHRONIC MIDLINE LOW BACK PAIN WITHOUT SCIATICA: ICD-10-CM

## 2020-11-16 DIAGNOSIS — G89.29 CHRONIC MIDLINE LOW BACK PAIN WITHOUT SCIATICA: ICD-10-CM

## 2020-11-16 DIAGNOSIS — G89.29 CHRONIC RIGHT SHOULDER PAIN: ICD-10-CM

## 2020-11-16 DIAGNOSIS — I10 ESSENTIAL HYPERTENSION: ICD-10-CM

## 2020-11-16 LAB
HBA1C MFR BLD: 6.7 % (ref 0–5.6)
INT CON NEG: NEGATIVE
INT CON POS: POSITIVE

## 2020-11-16 PROCEDURE — 83036 HEMOGLOBIN GLYCOSYLATED A1C: CPT | Performed by: NURSE PRACTITIONER

## 2020-11-16 PROCEDURE — 99214 OFFICE O/P EST MOD 30 MIN: CPT | Performed by: NURSE PRACTITIONER

## 2020-11-16 RX ORDER — LANCETS 30 GAUGE
EACH MISCELLANEOUS
Qty: 100 EACH | Refills: 3 | Status: SHIPPED | OUTPATIENT
Start: 2020-11-16

## 2020-11-16 RX ORDER — TRAMADOL HYDROCHLORIDE 50 MG/1
50 TABLET ORAL EVERY 12 HOURS PRN
Qty: 15 TAB | Refills: 0 | Status: SHIPPED | OUTPATIENT
Start: 2020-11-16 | End: 2020-11-26

## 2020-11-16 ASSESSMENT — FIBROSIS 4 INDEX: FIB4 SCORE: 1.72

## 2020-11-17 NOTE — ASSESSMENT & PLAN NOTE
Chronic in nature.  Stable.  Patient states that this will be severe sharp stabbing pain at times but that overall he is doing well.  States that he has started doing stretching which improves his symptoms but states that he would like a refill of tramadol for the very rare time when pain is severe.  Patient uses these sparingly last refill was approximately 1 year ago.

## 2020-11-17 NOTE — PROGRESS NOTES
Chief Complaint   Patient presents with   • Follow-Up     back pain and check in   • Medication Refill     Tramadol       HISTORY OF PRESENT ILLNESS: Patient is a 79 y.o. male established patient who presents today to discuss diabetes.     is used for today's visit.    Type 2 diabetes mellitus without complication (CMS-HCC)  Chronic in nature.  Stable.  Hemoglobin A1c today is 6.7.  Patient continues to take Metformin 500 mg twice daily as well as glipizide.  Patient is referred to ophthalmology again as he has not been seen.  She denies any numbness or tingling in his feet and states that overall he is feeling well new prescription for glucose monitor as well as strips and lancets will be provided today otherwise we will plan to continue current medication.    Essential hypertension  Chronic in nature.  Stable.  Pressure today is a little elevated at 140/62.  Patient states that it is not this high at home and does not wish to change his medication.  Patient denies chest pain, palpitations, dizziness, blurred vision.    Prostate cancer ([facility])  Patient denies symptoms at this time and refuses treatment follow-up with urologist.    Chronic right shoulder pain  Chronic in nature.  Stable.  Patient states that overall this is much improved.  That medication is working well and that he is mostly managing this with over-the-counter medication.  States that he will take tramadol intermittently for severe pain related to work.    Chronic midline low back pain without sciatica  Chronic in nature.  Stable.  Patient states that this will be severe sharp stabbing pain at times but that overall he is doing well.  States that he has started doing stretching which improves his symptoms but states that he would like a refill of tramadol for the very rare time when pain is severe.  Patient uses these sparingly last refill was approximately 1 year ago.      Patient Active Problem List    Diagnosis Date Noted   • Chronic  midline low back pain without sciatica 2020   • Chronic pain of right knee 2020   • Prostate cancer (HCC) 2018   • Chronic right shoulder pain 2018   • Hyperlipidemia 10/13/2017   • Type 2 diabetes mellitus without complication (HCC) 2017   • Essential hypertension 2017   • Benign non-nodular prostatic hyperplasia with lower urinary tract symptoms 2017       Allergies:Patient has no known allergies.    Current Outpatient Medications   Medication Sig Dispense Refill   • Blood Glucose Monitoring Suppl Device Meter: Dispense Device of Insurance Preference. Sig. Use as directed for blood sugar monitoring. #1. NR. 1 Each 0   • Lancets Lancets order: Lancets for insurance preference meter. Sig: use BID and prn ssx high or low sugar. #100 RF x 3 100 Each 3   • Blood Glucose Test Strips Test strips order: Test strips for insurance preference meter. Sig: use twice daily and prn ssx high or low sugar #100 RF x 3 100 Each 3   • traMADol (ULTRAM) 50 MG Tab Take 1 Tab by mouth every 12 hours as needed for Moderate Pain for up to 10 days. 15 Tab 0   • metFORMIN (GLUCOPHAGE) 500 MG Tab TOME FAMILIA TABLETA DOS VECES AL RAJENDRA 200 Tab 0   • glipiZIDE (GLUCOTROL) 5 MG Tab TOME FAMILIA TABLETA DOS VECES AL RAJENDRA 200 Tab 0   • tamsulosin (FLOMAX) 0.4 MG capsule Take 1 Cap by mouth 2 Times a Day. 90 Cap 0   • atorvastatin (LIPITOR) 40 MG Tab Take 1 Tab by mouth every day. 100 Tab 3   • benazepril (LOTENSIN) 5 MG Tab Take 1 Tab by mouth every day. 90 Tab 3   • Lancets Lancets order: Lancets Sig: use 2x daily and prn ssx high or low sugar. #100 RF x 3 100 Each 3   • glucose blood strip 1 Strip by Other route 2 Times a Day. 180 Strip 3     No current facility-administered medications for this visit.        Social History     Tobacco Use   • Smoking status: Former Smoker     Packs/day: 2.00     Years: 7.00     Pack years: 14.00     Quit date: 1959     Years since quittin.4   • Smokeless tobacco: Never  "Used   • Tobacco comment: 65 years ago   Substance Use Topics   • Alcohol use: No   • Drug use: No       Family Status   Relation Name Status   • Sis     • Mo     • Bro  Alive   • Bro     • Sis     • Sis     • Sis     • Neg Hx  (Not Specified)     Family History   Problem Relation Age of Onset   • Cancer Sister         stomach cancer   • Alcohol/Drug Brother    • Diabetes Sister    • Heart Disease Neg Hx        Review of Systems:   Constitutional:  Negative for fever, chills, weight loss and malaise/fatigue.   HEENT:  Negative for ear pain, nosebleeds, congestion, sore throat and neck pain.    Eyes:  Negative for blurred vision.   Respiratory:  Negative for cough, sputum production, shortness of breath and wheezing.    Cardiovascular:  Negative for chest pain, palpitations, orthopnea and leg swelling.   Gastrointestinal:  Negative for heartburn, nausea, vomiting and abdominal pain.   Genitourinary:  Negative for dysuria, urgency and frequency.   Musculoskeletal:  Negative for myalgias, back pain and joint pain.   Skin:  Negative for rash and itching.   Neurological:  Negative for dizziness, tingling, tremors, sensory change, focal weakness and headaches.   Endo/Heme/Allergies:  Does not bruise/bleed easily.   Psychiatric/Behavioral:  Negative for depression, suicidal ideas and memory loss.  The patient is not nervous/anxious and does not have insomnia.    All other systems reviewed and are negative except as in HPI.    Exam:  /62 (BP Location: Right arm, Patient Position: Sitting, BP Cuff Size: Adult)   Pulse 71   Temp 36.8 °C (98.2 °F) (Temporal)   Resp 16   Ht 1.651 m (5' 5\")   Wt 68.9 kg (151 lb 12.8 oz)   SpO2 96%   General:  Normal appearing. No distress.  HEENT:  Normocephalic. Eyes conjunctiva clear lids without ptosis, pupils equal and reactive to light accommodation, ears normal shape and contour, canals are clear bilaterally, tympanic membranes " are benign, nasal mucosa benign, oropharynx is without erythema, edema or exudates.   Neck:  Supple without JVD or bruit. Thyroid is not enlarged.  Pulmonary:  Clear to ausculation.  Normal effort. No rales, ronchi, or wheezing.  Cardiovascular:  Regular rate and rhythm without murmur. Carotid and radial pulses are intact and equal bilaterally.  Abdomen:  Soft, nontender, nondistended. Normal bowel sounds. Liver and spleen are not palpable  Neurologic:  Grossly nonfocal  Lymph:  No cervical, supraclavicular or axillary lymph nodes are palpable  Skin:  Warm and dry.  No obvious lesions.  Musculoskeletal: normal gait. No extremity cyanosis, clubbing, or edema.  Mild tenderness to palpation of midline lumbar spine, range of motion mildly limited, full range of motion is intact in shoulder and no pain to palpation mild pain with movement.  Psych:  Normal mood and affect. Alert and oriented x3. Judgment and insight is normal.      PLAN:    1. Chronic midline low back pain without sciatica  Modalities refilled at this time patient will continue to use this sparingly.  - Consent for Opiate Prescription    2. Type 2 diabetes mellitus without complication, without long-term current use of insulin ([facility])  - Blood Glucose Monitoring Suppl Device; Meter: Dispense Device of Insurance Preference. Sig. Use as directed for blood sugar monitoring. #1. NR.  Dispense: 1 Each; Refill: 0  - Lancets; Lancets order: Lancets for insurance preference meter. Sig: use BID and prn ssx high or low sugar. #100 RF x 3  Dispense: 100 Each; Refill: 3  - Blood Glucose Test Strips; Test strips order: Test strips for insurance preference meter. Sig: use twice daily and prn ssx high or low sugar #100 RF x 3  Dispense: 100 Each; Refill: 3  - POCT  A1C    4. Essential hypertension  Consider increasing dose of medication if blood pressure continues to be 140.    5. Prostate cancer ([facility])  Counseled patient regarding follow-up patient declines at this  time    6. Chronic right shoulder pain  Continue plan of care.    Follow-up in 3 months or sooner as needed.  Patient is encouraged to be seen in the emergency room for chest pain, palpitations, shortness of breath, dizziness, severe abdominal pain or other concerning symptoms.    Please note that this dictation was created using voice recognition software. I have made every reasonable attempt to correct obvious errors, but I expect that there are errors of grammar and possibly content that I did not discover before finalizing the note.    Assessment/Plan:  1. Chronic midline low back pain without sciatica  Consent for Opiate Prescription   2. Type 2 diabetes mellitus without complication, without long-term current use of insulin ([facility])  Blood Glucose Monitoring Suppl Device    Lancets    Blood Glucose Test Strips    POCT  A1C    CANCELED: HEMOGLOBIN A1C   3. Strain of lumbar paraspinous muscle, initial encounter  traMADol (ULTRAM) 50 MG Tab   4. Essential hypertension     5. Prostate cancer (HCC)     6. Chronic right shoulder pain            I have placed the below orders and discussed them with an approved delegating provider. The MA is performing the below orders under the direction of Dr. Cummins.

## 2020-11-17 NOTE — ASSESSMENT & PLAN NOTE
Chronic in nature.  Stable.  Patient states that overall this is much improved.  That medication is working well and that he is mostly managing this with over-the-counter medication.  States that he will take tramadol intermittently for severe pain related to work.

## 2020-11-17 NOTE — ASSESSMENT & PLAN NOTE
Chronic in nature.  Stable.  Pressure today is a little elevated at 140/62.  Patient states that it is not this high at home and does not wish to change his medication.  Patient denies chest pain, palpitations, dizziness, blurred vision.

## 2020-11-17 NOTE — ASSESSMENT & PLAN NOTE
Chronic in nature.  Stable.  Hemoglobin A1c today is 6.7.  Patient continues to take Metformin 500 mg twice daily as well as glipizide.  Patient is referred to ophthalmology again as he has not been seen.  She denies any numbness or tingling in his feet and states that overall he is feeling well new prescription for glucose monitor as well as strips and lancets will be provided today otherwise we will plan to continue current medication.

## 2021-01-05 DIAGNOSIS — E11.9 TYPE 2 DIABETES MELLITUS WITHOUT COMPLICATION, WITHOUT LONG-TERM CURRENT USE OF INSULIN (HCC): ICD-10-CM

## 2021-01-07 RX ORDER — GLIPIZIDE 5 MG/1
TABLET ORAL
Qty: 200 TAB | Refills: 0 | Status: SHIPPED | OUTPATIENT
Start: 2021-01-07 | End: 2021-02-18 | Stop reason: SDUPTHER

## 2021-01-11 DIAGNOSIS — Z23 NEED FOR VACCINATION: ICD-10-CM

## 2021-02-18 ENCOUNTER — OFFICE VISIT (OUTPATIENT)
Dept: MEDICAL GROUP | Facility: PHYSICIAN GROUP | Age: 81
End: 2021-02-18
Payer: MEDICARE

## 2021-02-18 VITALS
BODY MASS INDEX: 25.22 KG/M2 | HEIGHT: 65 IN | RESPIRATION RATE: 20 BRPM | TEMPERATURE: 97.6 F | OXYGEN SATURATION: 98 % | SYSTOLIC BLOOD PRESSURE: 166 MMHG | WEIGHT: 151.4 LBS | HEART RATE: 68 BPM | DIASTOLIC BLOOD PRESSURE: 72 MMHG

## 2021-02-18 DIAGNOSIS — M25.511 CHRONIC RIGHT SHOULDER PAIN: ICD-10-CM

## 2021-02-18 DIAGNOSIS — E78.2 MIXED HYPERLIPIDEMIA: ICD-10-CM

## 2021-02-18 DIAGNOSIS — G89.29 CHRONIC RIGHT SHOULDER PAIN: ICD-10-CM

## 2021-02-18 DIAGNOSIS — N40.1 BENIGN NON-NODULAR PROSTATIC HYPERPLASIA WITH LOWER URINARY TRACT SYMPTOMS: ICD-10-CM

## 2021-02-18 DIAGNOSIS — C61 MALIGNANT NEOPLASM OF PROSTATE (HCC): ICD-10-CM

## 2021-02-18 DIAGNOSIS — E11.9 TYPE 2 DIABETES MELLITUS WITHOUT COMPLICATION, WITHOUT LONG-TERM CURRENT USE OF INSULIN (HCC): ICD-10-CM

## 2021-02-18 DIAGNOSIS — M54.50 CHRONIC MIDLINE LOW BACK PAIN WITHOUT SCIATICA: ICD-10-CM

## 2021-02-18 DIAGNOSIS — G89.29 CHRONIC MIDLINE LOW BACK PAIN WITHOUT SCIATICA: ICD-10-CM

## 2021-02-18 DIAGNOSIS — I10 ESSENTIAL HYPERTENSION: ICD-10-CM

## 2021-02-18 LAB
HBA1C MFR BLD: 8.4 % (ref 0–5.6)
INT CON NEG: NEGATIVE
INT CON POS: POSITIVE

## 2021-02-18 PROCEDURE — 99214 OFFICE O/P EST MOD 30 MIN: CPT | Performed by: NURSE PRACTITIONER

## 2021-02-18 PROCEDURE — 83036 HEMOGLOBIN GLYCOSYLATED A1C: CPT | Performed by: NURSE PRACTITIONER

## 2021-02-18 RX ORDER — GLIPIZIDE 5 MG/1
5 TABLET ORAL 2 TIMES DAILY
Qty: 180 TABLET | Refills: 3 | Status: SHIPPED | OUTPATIENT
Start: 2021-02-18 | End: 2021-05-19

## 2021-02-18 RX ORDER — BENAZEPRIL HYDROCHLORIDE 5 MG/1
5 TABLET ORAL DAILY
Qty: 100 TABLET | Refills: 3 | Status: SHIPPED | OUTPATIENT
Start: 2021-02-18 | End: 2021-05-19

## 2021-02-18 RX ORDER — ATORVASTATIN CALCIUM 40 MG/1
40 TABLET, FILM COATED ORAL DAILY
Qty: 100 TABLET | Refills: 3 | Status: SHIPPED | OUTPATIENT
Start: 2021-02-18

## 2021-02-18 ASSESSMENT — FIBROSIS 4 INDEX: FIB4 SCORE: 1.74

## 2021-02-18 ASSESSMENT — PATIENT HEALTH QUESTIONNAIRE - PHQ9: CLINICAL INTERPRETATION OF PHQ2 SCORE: 0

## 2021-02-19 NOTE — ASSESSMENT & PLAN NOTE
Chronic in nature.  A1c today is 8.4 which is significantly increased from previous A1c.  Patient states that recently he has started eating a lot more bread and states that he has been forgetting his medications fairly frequently.  Denies any change in numbness or tingling in his feet denies any signs or symptoms of specifically high or low blood sugars.  States that in the past he has felt symptoms of higher blood sugars.  Patient does not wish to change his medications at this time but states that he will find a way to remind himself to take the medications daily and we are going to restart benazepril related to higher blood pressure.

## 2021-02-19 NOTE — ASSESSMENT & PLAN NOTE
Chronic in nature.  Increased today at 150/80.  Patient has stopped taking both benazepril and atorvastatin he thought that related to the numbers being better he could stop the medications.  Counseled patient that he will need to continue these medications as they have are because of improve numbers.  Improving his numbers.  Denies chest pain, palpitations, dizziness, blurry vision.

## 2021-02-19 NOTE — PROGRESS NOTES
Chief Complaint   Patient presents with   • Follow-Up     DM        HISTORY OF PRESENT ILLNESS: Patient is a 80 y.o. male established patient who presents today to discuss diabetes.    Type 2 diabetes mellitus without complication (CMS-[facility])  Chronic in nature.  A1c today is 8.4 which is significantly increased from previous A1c.  Patient states that recently he has started eating a lot more bread and states that he has been forgetting his medications fairly frequently.  Denies any change in numbness or tingling in his feet denies any signs or symptoms of specifically high or low blood sugars.  States that in the past he has felt symptoms of higher blood sugars.  Patient does not wish to change his medications at this time but states that he will find a way to remind himself to take the medications daily and we are going to restart benazepril related to higher blood pressure.    Essential hypertension  Chronic in nature.  Increased today at 150/80.  Patient has stopped taking both benazepril and atorvastatin he thought that related to the numbers being better he could stop the medications.  Counseled patient that he will need to continue these medications as they have are because of improve numbers.  Improving his numbers.  Denies chest pain, palpitations, dizziness, blurry vision.    Prostate cancer (HCC)  Patient denies symptoms at this time and refuses treatment or follow-up with urology.      Patient Active Problem List    Diagnosis Date Noted   • Chronic midline low back pain without sciatica 11/16/2020   • Chronic pain of right knee 03/19/2020   • Prostate cancer (HCC) 05/24/2018   • Chronic right shoulder pain 01/16/2018   • Hyperlipidemia 10/13/2017   • Type 2 diabetes mellitus without complication ([facility]) 09/08/2017   • Essential hypertension 09/08/2017   • Benign non-nodular prostatic hyperplasia with lower urinary tract symptoms 09/08/2017       Allergies:Patient has no known allergies.    Current Outpatient  Medications   Medication Sig Dispense Refill   • glipiZIDE (GLUCOTROL) 5 MG Tab Take 1 tablet by mouth 2 Times a Day for 90 days. 180 tablet 3   • benazepril (LOTENSIN) 5 MG Tab Take 1 tablet by mouth every day. 100 tablet 3   • atorvastatin (LIPITOR) 40 MG Tab Take 1 tablet by mouth every day. 100 tablet 3   • metFORMIN (GLUCOPHAGE) 500 MG Tab TOME FAMILIA TABLETA DOS VECES AL RAJENDRA 200 Tab 3   • Blood Glucose Monitoring Suppl Device Meter: Dispense Device of Insurance Preference. Sig. Use as directed for blood sugar monitoring. #1. NR. 1 Each 0   • Lancets Lancets order: Lancets for insurance preference meter. Sig: use BID and prn ssx high or low sugar. #100 RF x 3 100 Each 3   • Blood Glucose Test Strips Test strips order: Test strips for insurance preference meter. Sig: use twice daily and prn ssx high or low sugar #100 RF x 3 100 Each 3   • tamsulosin (FLOMAX) 0.4 MG capsule Take 1 Cap by mouth 2 Times a Day. 90 Cap 0   • Lancets Lancets order: Lancets Sig: use 2x daily and prn ssx high or low sugar. #100 RF x 3 100 Each 3   • glucose blood strip 1 Strip by Other route 2 Times a Day. 180 Strip 3     No current facility-administered medications for this visit.       Social History     Tobacco Use   • Smoking status: Former Smoker     Packs/day: 2.00     Years: 7.00     Pack years: 14.00     Quit date: 1959     Years since quittin.7   • Smokeless tobacco: Never Used   • Tobacco comment: 65 years ago   Substance Use Topics   • Alcohol use: No   • Drug use: No       Family Status   Relation Name Status   • Sis     • Mo     • Bro  Alive   • Bro     • Sis     • Sis     • Sis     • Neg Hx  (Not Specified)     Family History   Problem Relation Age of Onset   • Cancer Sister         stomach cancer   • Alcohol/Drug Brother    • Diabetes Sister    • Heart Disease Neg Hx        Review of Systems:   Constitutional:  Negative for fever, chills, weight loss and  "malaise/fatigue.   HEENT:  Negative for ear pain, nosebleeds, congestion, sore throat and neck pain.    Eyes:  Negative for blurred vision.   Respiratory:  Negative for cough, sputum production, shortness of breath and wheezing.    Cardiovascular:  Negative for chest pain, palpitations, orthopnea and leg swelling.   Gastrointestinal:  Negative for heartburn, nausea, vomiting and abdominal pain.   Genitourinary:  Negative for dysuria, urgency and frequency.   Musculoskeletal:  Negative for myalgias, back pain and joint pain.   Skin:  Negative for rash and itching.   Neurological:  Negative for dizziness, tingling, tremors, sensory change, focal weakness and headaches.   Endo/Heme/Allergies:  Does not bruise/bleed easily.   Psychiatric/Behavioral:  Negative for depression, suicidal ideas and memory loss.  The patient is not nervous/anxious and does not have insomnia.    All other systems reviewed and are negative except as in HPI.    Exam:  BP (!) 166/72 (BP Location: Left arm, Patient Position: Sitting, BP Cuff Size: Adult)   Pulse 68   Temp 36.4 °C (97.6 °F) (Temporal)   Resp 20   Ht 1.651 m (5' 5\")   Wt 68.7 kg (151 lb 6.4 oz)   SpO2 98%   Constitutional: Alert, no distress, well-groomed.  Skin: Warm, dry, good turgor, no rashes in visible areas.  Eye: Equal, round and reactive, conjunctiva clear, lids normal.  ENMT: Lips without lesions, good dentition, moist mucous membranes.  Neck: Trachea midline, no masses, no thyromegaly.  Respiratory: Unlabored respiratory effort, no cough.  MSK: Normal gait, moves all extremities.  Neuro: Grossly non-focal.   Psych: Alert and oriented x3, normal affect and mood.          PLAN:    1. Type 2 diabetes mellitus without complication, without long-term current use of insulin (HCC)  Patient's medication is refilled as requested and he will start taking his medications daily.  - POCT  A1C  - glipiZIDE (GLUCOTROL) 5 MG Tab; Take 1 tablet by mouth 2 Times a Day for 90 days.  " Dispense: 180 tablet; Refill: 3    2. Mixed hyperlipidemia  Medication is refilled discussed that patient needs to continue to take this daily  - atorvastatin (LIPITOR) 40 MG Tab; Take 1 tablet by mouth every day.  Dispense: 100 tablet; Refill: 3    3. Essential hypertension  Patient is refilled discussed that patient needs to continue to take this daily.  - benazepril (LOTENSIN) 5 MG Tab; Take 1 tablet by mouth every day.  Dispense: 100 tablet; Refill: 3    4. Benign non-nodular prostatic hyperplasia with lower urinary tract symptoms  Continue Flomax.    5. Chronic right shoulder pain  Patient states that this is stable and he does not need any refills at this time.    6. Chronic midline low back pain without sciatica  She states this is stable and he does not need any refills at this time.    7. Prostate cancer (HCC)  This is a past diagnosis that remains untreated.  Patient refuses referral to urology    Follow-up in 3 months or sooner as needed.  Please note that this dictation was created using voice recognition software. I have made every reasonable attempt to correct obvious errors, but I expect that there are errors of grammar and possibly content that I did not discover before finalizing the note.    Assessment/Plan:  1. Type 2 diabetes mellitus without complication, without long-term current use of insulin (HCC)  POCT  A1C    glipiZIDE (GLUCOTROL) 5 MG Tab   2. Mixed hyperlipidemia  atorvastatin (LIPITOR) 40 MG Tab   3. Essential hypertension  benazepril (LOTENSIN) 5 MG Tab   4. Benign non-nodular prostatic hyperplasia with lower urinary tract symptoms     5. Chronic right shoulder pain     6. Chronic midline low back pain without sciatica     7. Prostate cancer (HCC)            I have placed the below orders and discussed them with an approved delegating provider. The MA is performing the below orders under the direction of Dr. Cummins.

## 2021-03-29 DIAGNOSIS — E11.9 TYPE 2 DIABETES MELLITUS WITHOUT COMPLICATION, WITHOUT LONG-TERM CURRENT USE OF INSULIN (HCC): ICD-10-CM

## 2021-05-19 ENCOUNTER — OFFICE VISIT (OUTPATIENT)
Dept: MEDICAL GROUP | Facility: PHYSICIAN GROUP | Age: 81
End: 2021-05-19
Payer: MEDICARE

## 2021-05-19 VITALS
TEMPERATURE: 98.5 F | HEIGHT: 65 IN | OXYGEN SATURATION: 97 % | RESPIRATION RATE: 20 BRPM | SYSTOLIC BLOOD PRESSURE: 150 MMHG | BODY MASS INDEX: 24.83 KG/M2 | HEART RATE: 70 BPM | WEIGHT: 149 LBS | DIASTOLIC BLOOD PRESSURE: 70 MMHG

## 2021-05-19 DIAGNOSIS — E11.65 TYPE 2 DIABETES MELLITUS WITH HYPERGLYCEMIA, WITHOUT LONG-TERM CURRENT USE OF INSULIN (HCC): ICD-10-CM

## 2021-05-19 DIAGNOSIS — N40.1 BENIGN NON-NODULAR PROSTATIC HYPERPLASIA WITH LOWER URINARY TRACT SYMPTOMS: ICD-10-CM

## 2021-05-19 DIAGNOSIS — I10 ESSENTIAL HYPERTENSION: ICD-10-CM

## 2021-05-19 DIAGNOSIS — C61 MALIGNANT NEOPLASM OF PROSTATE (HCC): ICD-10-CM

## 2021-05-19 DIAGNOSIS — E78.2 MIXED HYPERLIPIDEMIA: ICD-10-CM

## 2021-05-19 LAB
HBA1C MFR BLD: 9 % (ref 0–5.6)
INT CON NEG: NEGATIVE
INT CON POS: POSITIVE

## 2021-05-19 PROCEDURE — 83036 HEMOGLOBIN GLYCOSYLATED A1C: CPT | Performed by: NURSE PRACTITIONER

## 2021-05-19 PROCEDURE — 99214 OFFICE O/P EST MOD 30 MIN: CPT | Performed by: NURSE PRACTITIONER

## 2021-05-19 RX ORDER — BENAZEPRIL HYDROCHLORIDE 10 MG/1
10 TABLET ORAL DAILY
Qty: 90 TABLET | Refills: 0 | Status: SHIPPED | OUTPATIENT
Start: 2021-05-19

## 2021-05-19 RX ORDER — GLIPIZIDE 10 MG/1
10 TABLET ORAL 2 TIMES DAILY
Qty: 60 TABLET | Refills: 0 | Status: SHIPPED | OUTPATIENT
Start: 2021-05-19 | End: 2021-06-14

## 2021-05-19 ASSESSMENT — FIBROSIS 4 INDEX: FIB4 SCORE: 1.74

## 2021-05-19 NOTE — PROGRESS NOTES
Annual Health Assessment Questions:    1.  Are you currently engaging in any exercise or physical activity? Yes    2.  How would you describe your mood or emotional well-being today? good    3.  Have you had any falls in the last year? No    4.  Have you noticed any problems with your balance or had difficulty walking? No    5.  In the last six months have you experienced any leakage of urine? Yes    6. DPA/Advanced Directive: Patient does not have an Advanced Directive.  A packet and workshop information was given on Advanced Directives.     Chief Complaint   Patient presents with   • Diabetes Follow-up       HISTORY OF PRESENT ILLNESS: Patient is a 80 y.o. male established patient who presents today to discuss diabetes.    Prostate cancer (HCC)  Chronic in nature.  Stable.  Patient states that he does not want follow-up with urology or treatment.    Type 2 diabetes mellitus with hyperglycemia (HCC)  Chronic in nature.  Hemoglobin A1c today is increased at 9.  We did discuss diet, and he states he has continued to eat the same diet.  He denies any numbness or tingling in his feet or any changes in symptoms.  Denies any clamminess or feeling that indicate very high or very low sugars.  He has not noticed any concerns with particularly high or low blood sugars.  He states that he feels like he has been taking his medication more consistently as such we did discuss in depth my concern with his increasing A1c and the need to change his medications.  Patient states that he does not wish to change Metformin related to side effects including diarrhea with increased dose.  Patient states that he is not having side effects on the lower dose of medication.    Essential hypertension  Chronic in nature.  Stable.  Blood pressure today is 150/70 which is elevated but is improved from last visit.  At this time we will plan to increase benazepril to hopefully get better control of his blood pressure.  Discussed with patient that I  would like his blood pressure to be under 150 systolic.  Patient denies any chest pain, palpitations, dizziness, blurry vision.    Hyperlipidemia  Chronic in nature.  Stable.  Patient continues to take atorvastatin 40 mg daily.      Patient Active Problem List    Diagnosis Date Noted   • Chronic midline low back pain without sciatica 11/16/2020   • Chronic pain of right knee 03/19/2020   • Prostate cancer (HCC) 05/24/2018   • Chronic right shoulder pain 01/16/2018   • Hyperlipidemia 10/13/2017   • Type 2 diabetes mellitus with hyperglycemia (HCC) 09/08/2017   • Essential hypertension 09/08/2017   • Benign non-nodular prostatic hyperplasia with lower urinary tract symptoms 09/08/2017       Allergies:Patient has no known allergies.    Current Outpatient Medications   Medication Sig Dispense Refill   • glipiZIDE (GLUCOTROL) 10 MG Tab Take 1 tablet by mouth 2 times a day. 60 tablet 0   • Blood Glucose Test Strips Test strips order: Test strips for insurance preference meter. Sig: use twice daily and prn ssx high or low sugar #100 RF x 3 100 Each 3   • benazepril (LOTENSIN) 10 MG Tab Take 1 tablet by mouth every day. 90 tablet 0   • tamsulosin (FLOMAX) 0.4 MG capsule TAKE 2 CAPS BY MOUTH EVERY DAY FOR 90 DAYS. 180 capsule 3   • glucose blood strip 1 Strip by Other route 2 Times a Day. 180 Strip 3   • atorvastatin (LIPITOR) 40 MG Tab Take 1 tablet by mouth every day. 100 tablet 3   • metFORMIN (GLUCOPHAGE) 500 MG Tab TOME FAMILIA TABLETA DOS VECES AL RAJENDRA 200 Tab 3   • Blood Glucose Monitoring Suppl Device Meter: Dispense Device of Insurance Preference. Sig. Use as directed for blood sugar monitoring. #1. NR. 1 Each 0   • Lancets Lancets order: Lancets for insurance preference meter. Sig: use BID and prn ssx high or low sugar. #100 RF x 3 100 Each 3   • Lancets Lancets order: Lancets Sig: use 2x daily and prn ssx high or low sugar. #100 RF x 3 100 Each 3     No current facility-administered medications for this visit.  "      Social History     Tobacco Use   • Smoking status: Former Smoker     Packs/day: 2.00     Years: 7.00     Pack years: 14.00     Quit date: 1959     Years since quittin.9   • Smokeless tobacco: Never Used   • Tobacco comment: 65 years ago   Vaping Use   • Vaping Use: Never used   Substance Use Topics   • Alcohol use: No   • Drug use: No       Family Status   Relation Name Status   • Sis     • Mo     • Bro  Alive   • Bro     • Sis     • Sis     • Sis     • Neg Hx  (Not Specified)     Family History   Problem Relation Age of Onset   • Cancer Sister         stomach cancer   • Alcohol/Drug Brother    • Diabetes Sister    • Heart Disease Neg Hx        ROS:   Patient denies headache, blurry vision, dizziness, chest pain, shortness of breath, abdominal pain, nausea, vomiting, change in level of consciousness.  All other systems reviewed and are negative except as in HPI.    Exam:  /70 (BP Location: Left arm, Patient Position: Sitting, BP Cuff Size: Adult)   Pulse 70   Temp 36.9 °C (98.5 °F) (Temporal)   Resp 20   Ht 1.651 m (5' 5\")   Wt 67.6 kg (149 lb)   SpO2 97%   General:  Normal appearing. No distress.  Pulmonary:  Clear to ausculation.  Normal effort. No rales, ronchi, or wheezing.  Cardiovascular:  Regular rate and rhythm without murmur. Carotid and radial pulses are intact and equal bilaterally.  Neurologic:  Grossly nonfocal  Lymph:  No cervical, supraclavicular or axillary lymph nodes are palpable  Skin:  Warm and dry.  No obvious lesions.  Musculoskeletal:  Normal gait. No extremity cyanosis, clubbing, or edema.  Psych:  Normal mood and affect. Alert and oriented x3. Judgment and insight is normal.      PLAN:    1. Essential hypertension  At this time is to increase dose of blood pressure medication related to elevated blood pressure, discussed goal of blood pressure less than 150 systolic.  - benazepril (LOTENSIN) 10 MG Tab; Take 1 tablet " by mouth every day.  Dispense: 90 tablet; Refill: 0    2. Benign non-nodular prostatic hyperplasia with lower urinary tract symptoms  Continue to monitor at this time.    3. Prostate cancer (HCC)  Patient declines follow-up on this issue.    4. Mixed hyperlipidemia  Chronic in nature.  Stable.  Patient continues to take atorvastatin 40 mg daily.    5. Type 2 diabetes mellitus with hyperglycemia, without long-term current use of insulin (HCC)  With regard to hemoglobin A1c of 9 we discussed the importance of small diet changes to improve blood sugar as well as the importance of taking medications daily patient does state that he has been taking medications daily as such the plan at this time is to increase his dose of glipizide did  patient extensively regarding possible side effects of the medication including lower blood sugars.  Patient will contact me immediately for any changes or side effects.  If blood sugar does not improve we may have to consider injectable medication, and discuss other options for treatment patient is not amenable to injectable medications at this time.  - POCT  A1C  - CBC WITH DIFFERENTIAL; Future  - Comp Metabolic Panel; Future  - Lipid Profile; Future  - Microalbumin Creat Ratio Urine - Lab Collect; Future  - glipiZIDE (GLUCOTROL) 10 MG Tab; Take 1 tablet by mouth 2 times a day.  Dispense: 60 tablet; Refill: 0  - Blood Glucose Test Strips; Test strips order: Test strips for insurance preference meter. Sig: use twice daily and prn ssx high or low sugar #100 RF x 3  Dispense: 100 Each; Refill: 3      Return in about 3 months (around 8/19/2021), or if symptoms worsen or fail to improve.    Please note that this dictation was created using voice recognition software. I have made every reasonable attempt to correct obvious errors, but I expect that there are errors of grammar and possibly content that I did not discover before finalizing the note.

## 2021-05-20 PROBLEM — E11.65 TYPE 2 DIABETES MELLITUS WITH HYPERGLYCEMIA (HCC): Status: ACTIVE | Noted: 2017-09-08

## 2021-05-21 ENCOUNTER — HOSPITAL ENCOUNTER (OUTPATIENT)
Dept: LAB | Facility: MEDICAL CENTER | Age: 81
End: 2021-05-21
Attending: NURSE PRACTITIONER
Payer: MEDICARE

## 2021-05-21 DIAGNOSIS — E11.65 TYPE 2 DIABETES MELLITUS WITH HYPERGLYCEMIA, WITHOUT LONG-TERM CURRENT USE OF INSULIN (HCC): ICD-10-CM

## 2021-05-21 LAB
ALBUMIN SERPL BCP-MCNC: 4.2 G/DL (ref 3.2–4.9)
ALBUMIN/GLOB SERPL: 1.4 G/DL
ALP SERPL-CCNC: 74 U/L (ref 30–99)
ALT SERPL-CCNC: 32 U/L (ref 2–50)
ANION GAP SERPL CALC-SCNC: 7 MMOL/L (ref 7–16)
AST SERPL-CCNC: 21 U/L (ref 12–45)
BASOPHILS # BLD AUTO: 0.5 % (ref 0–1.8)
BASOPHILS # BLD: 0.02 K/UL (ref 0–0.12)
BILIRUB SERPL-MCNC: 0.6 MG/DL (ref 0.1–1.5)
BUN SERPL-MCNC: 20 MG/DL (ref 8–22)
CALCIUM SERPL-MCNC: 9 MG/DL (ref 8.5–10.5)
CHLORIDE SERPL-SCNC: 102 MMOL/L (ref 96–112)
CHOLEST SERPL-MCNC: 124 MG/DL (ref 100–199)
CO2 SERPL-SCNC: 26 MMOL/L (ref 20–33)
CREAT SERPL-MCNC: 1.09 MG/DL (ref 0.5–1.4)
CREAT UR-MCNC: 55.91 MG/DL
EOSINOPHIL # BLD AUTO: 0.13 K/UL (ref 0–0.51)
EOSINOPHIL NFR BLD: 3.1 % (ref 0–6.9)
ERYTHROCYTE [DISTWIDTH] IN BLOOD BY AUTOMATED COUNT: 42.9 FL (ref 35.9–50)
FASTING STATUS PATIENT QL REPORTED: NORMAL
GLOBULIN SER CALC-MCNC: 3.1 G/DL (ref 1.9–3.5)
GLUCOSE SERPL-MCNC: 230 MG/DL (ref 65–99)
HCT VFR BLD AUTO: 43.9 % (ref 42–52)
HDLC SERPL-MCNC: 51 MG/DL
HGB BLD-MCNC: 14.9 G/DL (ref 14–18)
IMM GRANULOCYTES # BLD AUTO: 0.01 K/UL (ref 0–0.11)
IMM GRANULOCYTES NFR BLD AUTO: 0.2 % (ref 0–0.9)
LDLC SERPL CALC-MCNC: 53 MG/DL
LYMPHOCYTES # BLD AUTO: 1.46 K/UL (ref 1–4.8)
LYMPHOCYTES NFR BLD: 34.3 % (ref 22–41)
MCH RBC QN AUTO: 29.9 PG (ref 27–33)
MCHC RBC AUTO-ENTMCNC: 33.9 G/DL (ref 33.7–35.3)
MCV RBC AUTO: 88 FL (ref 81.4–97.8)
MICROALBUMIN UR-MCNC: <1.2 MG/DL
MICROALBUMIN/CREAT UR: NORMAL MG/G (ref 0–30)
MONOCYTES # BLD AUTO: 0.35 K/UL (ref 0–0.85)
MONOCYTES NFR BLD AUTO: 8.2 % (ref 0–13.4)
NEUTROPHILS # BLD AUTO: 2.29 K/UL (ref 1.82–7.42)
NEUTROPHILS NFR BLD: 53.7 % (ref 44–72)
NRBC # BLD AUTO: 0 K/UL
NRBC BLD-RTO: 0 /100 WBC
PLATELET # BLD AUTO: 171 K/UL (ref 164–446)
PMV BLD AUTO: 11 FL (ref 9–12.9)
POTASSIUM SERPL-SCNC: 4.1 MMOL/L (ref 3.6–5.5)
PROT SERPL-MCNC: 7.3 G/DL (ref 6–8.2)
RBC # BLD AUTO: 4.99 M/UL (ref 4.7–6.1)
SODIUM SERPL-SCNC: 135 MMOL/L (ref 135–145)
TRIGL SERPL-MCNC: 100 MG/DL (ref 0–149)
WBC # BLD AUTO: 4.3 K/UL (ref 4.8–10.8)

## 2021-05-21 PROCEDURE — 85025 COMPLETE CBC W/AUTO DIFF WBC: CPT

## 2021-05-21 PROCEDURE — 82570 ASSAY OF URINE CREATININE: CPT

## 2021-05-21 PROCEDURE — 82043 UR ALBUMIN QUANTITATIVE: CPT

## 2021-05-21 PROCEDURE — 80053 COMPREHEN METABOLIC PANEL: CPT

## 2021-05-21 PROCEDURE — 36415 COLL VENOUS BLD VENIPUNCTURE: CPT

## 2021-05-21 PROCEDURE — 80061 LIPID PANEL: CPT

## 2021-05-21 NOTE — ASSESSMENT & PLAN NOTE
Chronic in nature.  Stable.  Patient states that he does not want follow-up with urology or treatment.

## 2021-05-21 NOTE — ASSESSMENT & PLAN NOTE
Chronic in nature.  Hemoglobin A1c today is increased at 9.  We did discuss diet, and he states he has continued to eat the same diet.  He denies any numbness or tingling in his feet or any changes in symptoms.  Denies any clamminess or feeling that indicate very high or very low sugars.  He has not noticed any concerns with particularly high or low blood sugars.  He states that he feels like he has been taking his medication more consistently as such we did discuss in depth my concern with his increasing A1c and the need to change his medications.  Patient states that he does not wish to change Metformin related to side effects including diarrhea with increased dose.  Patient states that he is not having side effects on the lower dose of medication.

## 2021-05-21 NOTE — ASSESSMENT & PLAN NOTE
Chronic in nature.  Stable.  Blood pressure today is 150/70 which is elevated but is improved from last visit.  At this time we will plan to increase benazepril to hopefully get better control of his blood pressure.  Discussed with patient that I would like his blood pressure to be under 150 systolic.  Patient denies any chest pain, palpitations, dizziness, blurry vision.

## 2021-05-25 ENCOUNTER — TELEPHONE (OUTPATIENT)
Dept: MEDICAL GROUP | Facility: PHYSICIAN GROUP | Age: 81
End: 2021-05-25

## 2021-05-25 NOTE — TELEPHONE ENCOUNTER
----- Message from BRIAN Navarro sent at 5/25/2021  3:11 PM PDT -----  Please call pt and give lab results: Labs are overall stable aside from the elevated glucose which we were aware of related to your A1c at your last appointment.  We can definitely discuss these further at your follow-up appointment in 3 months.

## 2021-05-25 NOTE — TELEPHONE ENCOUNTER
Phone Number Called: [phone] (home)       Call outcome: Spoke to patient regarding message below.    Message: left a message with lady who  phone did not leave a detailed message informed lady to let patient know we called and if he can call us back.

## 2021-06-13 DIAGNOSIS — E11.65 TYPE 2 DIABETES MELLITUS WITH HYPERGLYCEMIA, WITHOUT LONG-TERM CURRENT USE OF INSULIN (HCC): ICD-10-CM

## 2021-06-14 RX ORDER — GLIPIZIDE 10 MG/1
TABLET ORAL
Qty: 60 TABLET | Refills: 0 | Status: SHIPPED | OUTPATIENT
Start: 2021-06-14 | End: 2021-07-12

## 2021-07-09 ENCOUNTER — PATIENT OUTREACH (OUTPATIENT)
Dept: HEALTH INFORMATION MANAGEMENT | Facility: OTHER | Age: 81
End: 2021-07-09

## 2021-07-09 DIAGNOSIS — E11.65 TYPE 2 DIABETES MELLITUS WITH HYPERGLYCEMIA, WITHOUT LONG-TERM CURRENT USE OF INSULIN (HCC): ICD-10-CM

## 2021-07-09 NOTE — NON-PROVIDER
Outcome: Left Message to schedule YOHANNES     Please transfer to Patient Outreach Team at [phone] when patient returns call.    [facility] Verified: yes    Attempt # 1

## 2021-07-12 RX ORDER — GLIPIZIDE 10 MG/1
TABLET ORAL
Qty: 60 TABLET | Refills: 0 | Status: SHIPPED | OUTPATIENT
Start: 2021-07-12 | End: 2021-07-26

## 2021-07-26 DIAGNOSIS — E11.65 TYPE 2 DIABETES MELLITUS WITH HYPERGLYCEMIA, WITHOUT LONG-TERM CURRENT USE OF INSULIN (HCC): ICD-10-CM

## 2021-07-26 RX ORDER — GLIPIZIDE 10 MG/1
TABLET ORAL
Qty: 180 TABLET | Refills: 0 | Status: SHIPPED | OUTPATIENT
Start: 2021-07-26

## 2021-09-09 ENCOUNTER — TELEPHONE (OUTPATIENT)
Dept: MEDICAL GROUP | Facility: PHYSICIAN GROUP | Age: 81
End: 2021-09-09

## 2021-09-09 NOTE — TELEPHONE ENCOUNTER
9/9/2021 called & spoke to patient's wife using  (#[phone]) to follow-up on patient's blood pressure, they do not take at home, offered office visit to check same, wife to speak with patient and will call and make an appointment to check BP.

## 2021-10-11 ENCOUNTER — TELEPHONE (OUTPATIENT)
Dept: MEDICAL GROUP | Facility: PHYSICIAN GROUP | Age: 81
End: 2021-10-11

## 2021-10-11 NOTE — TELEPHONE ENCOUNTER
Phone conversation with wife using  (#[phone]), explained need for updated blood pressure reading, wife will check with  and call me back, explained we could check his blood pressure in the office at no charge.

## 2022-09-30 NOTE — ASSESSMENT & PLAN NOTE
Dx 05/2018. He had refused treatment, because it is expensive to pay copayment and see urologist all the time. He states that he has had happy life until now, and he is 78 y/o. It is possible time for him to go, whenever it will be. He refuses to follow up with urology, family aware of his decisions.    Pt calling about lab results